# Patient Record
Sex: FEMALE | Race: WHITE | NOT HISPANIC OR LATINO
[De-identification: names, ages, dates, MRNs, and addresses within clinical notes are randomized per-mention and may not be internally consistent; named-entity substitution may affect disease eponyms.]

---
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
## 2021-01-04 ENCOUNTER — NON-APPOINTMENT (OUTPATIENT)
Age: 21
End: 2021-01-04

## 2021-01-08 ENCOUNTER — LABORATORY RESULT (OUTPATIENT)
Age: 21
End: 2021-01-08

## 2021-01-08 ENCOUNTER — APPOINTMENT (OUTPATIENT)
Dept: DISASTER EMERGENCY | Facility: CLINIC | Age: 21
End: 2021-01-08

## 2021-01-11 ENCOUNTER — INPATIENT (INPATIENT)
Facility: HOSPITAL | Age: 21
LOS: 1 days | Discharge: HOME | DRG: 93 | End: 2021-01-13
Attending: HOSPITALIST | Admitting: HOSPITALIST
Payer: MEDICAID

## 2021-01-11 VITALS
TEMPERATURE: 98 F | OXYGEN SATURATION: 100 % | DIASTOLIC BLOOD PRESSURE: 75 MMHG | SYSTOLIC BLOOD PRESSURE: 105 MMHG | RESPIRATION RATE: 18 BRPM | HEART RATE: 99 BPM

## 2021-01-11 DIAGNOSIS — N32.89 OTHER SPECIFIED DISORDERS OF BLADDER: ICD-10-CM

## 2021-01-11 DIAGNOSIS — N39.0 URINARY TRACT INFECTION, SITE NOT SPECIFIED: ICD-10-CM

## 2021-01-11 DIAGNOSIS — G40.909 EPILEPSY, UNSPECIFIED, NOT INTRACTABLE, WITHOUT STATUS EPILEPTICUS: ICD-10-CM

## 2021-01-11 DIAGNOSIS — Z29.9 ENCOUNTER FOR PROPHYLACTIC MEASURES, UNSPECIFIED: ICD-10-CM

## 2021-01-11 DIAGNOSIS — I34.1 NONRHEUMATIC MITRAL (VALVE) PROLAPSE: ICD-10-CM

## 2021-01-11 DIAGNOSIS — N20.0 CALCULUS OF KIDNEY: ICD-10-CM

## 2021-01-11 LAB
ALBUMIN SERPL ELPH-MCNC: 4.4 G/DL — SIGNIFICANT CHANGE UP (ref 3.3–5.2)
ALP SERPL-CCNC: 95 U/L — SIGNIFICANT CHANGE UP (ref 40–120)
ALT FLD-CCNC: 14 U/L — SIGNIFICANT CHANGE UP
ANION GAP SERPL CALC-SCNC: 10 MMOL/L — SIGNIFICANT CHANGE UP (ref 5–17)
AST SERPL-CCNC: 23 U/L — SIGNIFICANT CHANGE UP
BASOPHILS # BLD AUTO: 0.02 K/UL — SIGNIFICANT CHANGE UP (ref 0–0.2)
BASOPHILS NFR BLD AUTO: 0.4 % — SIGNIFICANT CHANGE UP (ref 0–2)
BILIRUB SERPL-MCNC: 0.4 MG/DL — SIGNIFICANT CHANGE UP (ref 0.4–2)
BUN SERPL-MCNC: 7 MG/DL — LOW (ref 8–20)
CALCIUM SERPL-MCNC: 9.4 MG/DL — SIGNIFICANT CHANGE UP (ref 8.6–10.2)
CHLORIDE SERPL-SCNC: 105 MMOL/L — SIGNIFICANT CHANGE UP (ref 98–107)
CO2 SERPL-SCNC: 23 MMOL/L — SIGNIFICANT CHANGE UP (ref 22–29)
CREAT SERPL-MCNC: 0.53 MG/DL — SIGNIFICANT CHANGE UP (ref 0.5–1.3)
EOSINOPHIL # BLD AUTO: 0.14 K/UL — SIGNIFICANT CHANGE UP (ref 0–0.5)
EOSINOPHIL NFR BLD AUTO: 2.8 % — SIGNIFICANT CHANGE UP (ref 0–6)
GLUCOSE SERPL-MCNC: 80 MG/DL — SIGNIFICANT CHANGE UP (ref 70–99)
HCT VFR BLD CALC: 38.4 % — SIGNIFICANT CHANGE UP (ref 34.5–45)
HGB BLD-MCNC: 12.7 G/DL — SIGNIFICANT CHANGE UP (ref 11.5–15.5)
IMM GRANULOCYTES NFR BLD AUTO: 0 % — SIGNIFICANT CHANGE UP (ref 0–1.5)
LYMPHOCYTES # BLD AUTO: 1.94 K/UL — SIGNIFICANT CHANGE UP (ref 1–3.3)
LYMPHOCYTES # BLD AUTO: 38.5 % — SIGNIFICANT CHANGE UP (ref 13–44)
MAGNESIUM SERPL-MCNC: 2.1 MG/DL — SIGNIFICANT CHANGE UP (ref 1.6–2.6)
MCHC RBC-ENTMCNC: 27.4 PG — SIGNIFICANT CHANGE UP (ref 27–34)
MCHC RBC-ENTMCNC: 33.1 GM/DL — SIGNIFICANT CHANGE UP (ref 32–36)
MCV RBC AUTO: 82.9 FL — SIGNIFICANT CHANGE UP (ref 80–100)
MONOCYTES # BLD AUTO: 0.34 K/UL — SIGNIFICANT CHANGE UP (ref 0–0.9)
MONOCYTES NFR BLD AUTO: 6.7 % — SIGNIFICANT CHANGE UP (ref 2–14)
NEUTROPHILS # BLD AUTO: 2.6 K/UL — SIGNIFICANT CHANGE UP (ref 1.8–7.4)
NEUTROPHILS NFR BLD AUTO: 51.6 % — SIGNIFICANT CHANGE UP (ref 43–77)
PLATELET # BLD AUTO: 335 K/UL — SIGNIFICANT CHANGE UP (ref 150–400)
POTASSIUM SERPL-MCNC: 3.9 MMOL/L — SIGNIFICANT CHANGE UP (ref 3.5–5.3)
POTASSIUM SERPL-SCNC: 3.9 MMOL/L — SIGNIFICANT CHANGE UP (ref 3.5–5.3)
PROT SERPL-MCNC: 7.3 G/DL — SIGNIFICANT CHANGE UP (ref 6.6–8.7)
RBC # BLD: 4.63 M/UL — SIGNIFICANT CHANGE UP (ref 3.8–5.2)
RBC # FLD: 12.5 % — SIGNIFICANT CHANGE UP (ref 10.3–14.5)
SODIUM SERPL-SCNC: 138 MMOL/L — SIGNIFICANT CHANGE UP (ref 135–145)
WBC # BLD: 5.04 K/UL — SIGNIFICANT CHANGE UP (ref 3.8–10.5)
WBC # FLD AUTO: 5.04 K/UL — SIGNIFICANT CHANGE UP (ref 3.8–10.5)

## 2021-01-11 PROCEDURE — 99222 1ST HOSP IP/OBS MODERATE 55: CPT

## 2021-01-11 PROCEDURE — 95720 EEG PHY/QHP EA INCR W/VEEG: CPT

## 2021-01-11 RX ORDER — DESIPRAMINE HYDROCHLORIDE 100 MG/1
10 TABLET ORAL AT BEDTIME
Refills: 0 | Status: DISCONTINUED | OUTPATIENT
Start: 2021-01-11 | End: 2021-01-13

## 2021-01-11 RX ORDER — DESIPRAMINE HYDROCHLORIDE 100 MG/1
1 TABLET ORAL
Qty: 0 | Refills: 0 | DISCHARGE

## 2021-01-11 RX ORDER — ENOXAPARIN SODIUM 100 MG/ML
40 INJECTION SUBCUTANEOUS EVERY 24 HOURS
Refills: 0 | Status: DISCONTINUED | OUTPATIENT
Start: 2021-01-11 | End: 2021-01-13

## 2021-01-11 RX ADMIN — DESIPRAMINE HYDROCHLORIDE 10 MILLIGRAM(S): 100 TABLET ORAL at 21:37

## 2021-01-11 NOTE — H&P ADULT - ASSESSMENT
20 year-old female with a history of recurrent UTI since age 8, B/L nephrolithiasis and indeterminate 1.5cm posterior bladder lesion on CT who presents for continuous vEEg to differentiate epileptic from nonepileptic events. In October 2020, patient had intermittent episodes of B/L eye blinking while maintaining awareness and still able to speak, though struggles. In November, behavior changed to staring and unresponsiveness, but still able to hear and somewhat aware of her surroundings. Then, developed unresponsiveness, facial grimacing, intermittent twitching/jerking of different parts of the body, back arching, hands stiffened and positioned in contracted states. These episodes may last up to 2 hours, with gradual return of movements and then speech. Has been admitted to [facility] for these episodes. Discharged without AED. Of note, patient has had episodes of chest pain intermittently, and is by cardiology outpatient.

## 2021-01-11 NOTE — H&P ADULT - NSHPPHYSICALEXAM_GEN_ALL_CORE
PHYSICAL EXAM:  GENERAL: NAD, well-groomed, well-developed  HEAD:  Atraumatic, Normocephalic  NECK: Supple, No JVD, Normal thyroid  NERVOUS SYSTEM:  Alert & Oriented X3, Good concentration; Motor Strength 5/5 B/L upper and lower extremities  CHEST/LUNG: Clear to auscultation bilaterally; No rales, rhonchi, wheezing, or rubs  HEART: Regular rate and rhythm; No murmurs, rubs, or gallops  ABDOMEN: Soft, Nontender, Nondistended; Bowel sounds present  EXTREMITIES:  2+ Peripheral Pulses, No clubbing, cyanosis, or edema

## 2021-01-11 NOTE — H&P ADULT - NSHPREVIEWOFSYSTEMS_GEN_ALL_CORE
REVIEW OF SYSTEMS:  CONSTITUTIONAL: No fever, weight loss, or fatigue  EYES: No eye pain or visual disturbances  RESPIRATORY: No cough, wheezing, or chills; No shortness of breath  CARDIOVASCULAR: No chest pain, palpitations, dizziness, or leg swelling  GASTROINTESTINAL: No abdominal or epigastric pain. No nausea or vomiting; No diarrhea or constipation.   GENITOURINARY: No dysuria, frequency, hematuria, or incontinence  NEUROLOGICAL: No headaches, memory loss, loss of strength, numbness, or tremors  SKIN: No itching, burning, rashes, or lesions   MUSCULOSKELETAL: No joint pain or swelling; No muscle, back, or extremity pain  PSYCHIATRIC: No depression, anxiety, mood swings, or difficulty sleeping

## 2021-01-11 NOTE — CONSULT NOTE ADULT - SUBJECTIVE AND OBJECTIVE BOX
[facility]                                                                     Lissett Brown MD                                              Epilepsy Consult #: [phone] ([phone])                                               Office: [address] [place] [address], [address]                                                 Phone: [phone]; Fax: [phone]                            ==============================================    EPILEPSY INITIAL CONSULT NOTE      HPI  This is a 20 year-old RH female with a history of recurrent UTI since age 8, BL nephrolithiasis and indeterminate 1.5cm posterior bladder lesion on CT who presents to EMU to differentiate epileptic from nonepileptic events    October of this year, began to develop intermittent episodes of BL eye blinking while maintaining awareness and still able to speak, though struggles. In November, behavior changed to staring and unresponsiveness, but still able to hear and somewhat aware of her surroundings. Then last few days, developed unresponsiveness, facial grimacing, intermittent twitching/jerking of different parts of the body, back arching, hands stiffened and positioned in contracted states. These episodes may last up to 2 hours, with gradual return of movements and then speech. Mother recorded all 3 types of behaviors on video. Reviewed all 3 in office today. Has been admitted to [facility] for these episodes. Discharged without AED.     When asked about stress in life, admits to emotional stress and exhaust of seemingly endless workup, treatment and doctor visits for recurrent UTI, pain from BL nephrolithiasis, and recent discovery of the bladder lesion. Pending cystoscopy next week. Parents  when she was 6yo, and she sometimes feel she needs to be strong for her mother and be the emotional support for her mother.     SEIZURE RISK FACTORS:  Patient was a product of normal pregnancy and delivery. No history of febrile seizure, TBI, CNS infection, or FH of seizures.    CURRENT AED:  none    PREVIOUS AED:  none    IMAGING:   CTH: prominent ventricles     NEUROPHYSIOLOGY:  Per pt, rEEG at [facility] was unremarkable.     NEUROPSYCHOLOGY:   none      PAST MEDICAL HISTORY:  recurrent UTI since age 8, BL nephrolithiasis and indeterminate 1.5cm posterior bladder lesion    PAST SURGICAL HISTORY:   none    HOME MEDICATIONS:     ALLERGIES:   NKDA    FAMILY HISTORY:  Non-contributory    SOCIAL HISTORY:   Denied EtOH, tobacco, illicit drugs.    ROS:  Negative for constitutional, skin, eyes, ENT, respiratory, cardiovascular, gastrointestinal, genitourinary, musculoskeletal, neurologic, psychiatric, hematology/lymphatics, endocrine, allergic/immunologic.      VITALS:  pending    GENERAL PHYSICAL EXAM:  GEN: no distress  HEENT: NCAT, OP clear  EYES: sclera white, conjunctiva clear, no nystagmus  NECK: supple  CV: RRR, no murmur     		  PULM: CTAB, no wheezing  ABD: soft ABD, +BS, NT  EXT: peripheral pulse intact, no cyanosis  MSK: muscle tone and strength normal  SKIN: warm, dry, no rash or lesion on exposed skin    NEUROLOGICAL EXAM:  Mental Status  Orientation: alert and oriented to person, place, time, and situation   Language: clear and fluent, intact comprehension and repetition, intact naming and reading    Cranial Nerves  II: full visual fields intact   III, IV, VI: PERRL, EOMI  V, VII: facial sensation and movement intact and symmetric   VIII: hearing intact   IX, X: uvula midline, soft palate elevates normally   XI: BL shoulder shrug intact   XII: tongue midline    Motor  5/5 BUE and BLE                 Tone and bulk are normal in upper and lower limbs  No pronator drift    Sensation  Intact to light touch and pinprick in all 4 EXTs    Reflex  2+ in BL biceps and patella                                   Plantar responses downward bilaterally    Coordination  Normal FTN bilaterally    Gait  Deferred      LABS:   pending                 Alert-The patient is alert, awake and responds to voice. The patient is oriented to time, place, and person. The triage nurse is able to obtain subjective information.

## 2021-01-11 NOTE — CONSULT NOTE ADULT - ASSESSMENT
20 year-old RH female with a history of recurrent UTI since age 8, BL nephrolithiasis and indeterminate 1.5cm posterior bladder lesion on CT who presents to EMU to differentiate epileptic from nonepileptic events.      Recommendation:  - cvEEG to characterize seizure-like spells     - not on antiepileptic medication   - notify epilepsy to review EEG before giving any med for seizure-like activity    - seizure precaution      Thank you for allowing Epilepsy to participate in the care of this patient.   ______________________  Lissett Brown MD   [facility] [place] [facility]  Cell: [phone]  Epilepsy Consult #: [phone] ([phone])

## 2021-01-11 NOTE — H&P ADULT - HISTORY OF PRESENT ILLNESS
20 year-old female with a history of recurrent UTI since age 8, B/L nephrolithiasis and indeterminate 1.5cm posterior bladder lesion on CT who presents for continuous vEEg to differentiate epileptic from nonepileptic events. In October 2020, patient had intermittent episodes of B/L eye blinking while maintaining awareness and still able to speak, though struggles. In November, behavior changed to staring and unresponsiveness, but still able to hear and somewhat aware of her surroundings. Then, developed unresponsiveness, facial grimacing, intermittent twitching/jerking of different parts of the body, back arching, hands stiffened and positioned in contracted states. These episodes may last up to 2 hours, with gradual return of movements and then speech. Has been admitted to [facility] for these episodes. Discharged without AED. Of note, patient has had episodes of chest pain intermittently, and is by cardiology outpatient.

## 2021-01-11 NOTE — H&P ADULT - ATTENDING COMMENTS
Patient seen and examined ,   AAOX4 , comfortable , no complaints at present .   Above noted and reviewed with NP ( Sara )   Vitals - pending   Dr Brown/ Epileptologist noted and appreciated   continue vEEG   fall / seizure precautions   follow up labs   Lovenox for dvt prophylaxis

## 2021-01-12 DIAGNOSIS — I95.1 ORTHOSTATIC HYPOTENSION: ICD-10-CM

## 2021-01-12 LAB
SARS-COV-2 IGG SERPL QL IA: NEGATIVE — SIGNIFICANT CHANGE UP
SARS-COV-2 IGM SERPL IA-ACNC: <0.1 INDEX — SIGNIFICANT CHANGE UP

## 2021-01-12 PROCEDURE — 99233 SBSQ HOSP IP/OBS HIGH 50: CPT

## 2021-01-12 PROCEDURE — 95720 EEG PHY/QHP EA INCR W/VEEG: CPT

## 2021-01-12 RX ADMIN — DESIPRAMINE HYDROCHLORIDE 10 MILLIGRAM(S): 100 TABLET ORAL at 21:08

## 2021-01-12 NOTE — PROGRESS NOTE ADULT - ASSESSMENT
20 year-old female with a history of recurrent UTI since age 8, B/L nephrolithiasis and indeterminate 1.5cm posterior bladder lesion on CT who presents for continuous vEEg to differentiate epileptic from nonepileptic events. In October 2020, patient had intermittent episodes of B/L eye blinking while maintaining awareness and still able to speak, though struggles. In November, behavior changed to staring and unresponsiveness, but still able to hear and somewhat aware of her surroundings. Then, developed unresponsiveness, facial grimacing, intermittent twitching/jerking of different parts of the body, back arching, hands stiffened and positioned in contracted states. These episodes may last up to 2 hours, with gradual return of movements and then speech. Has been admitted to [facility] [place] for these episodes. Discharged without AED. Of note, patient has had episodes of chest pain intermittently, and is by cardiology outpatient.

## 2021-01-12 NOTE — PROGRESS NOTE ADULT - ATTENDING COMMENTS
Patient seen and examined , noted with Postural tachycardia , HR increased > 30 bpm with standing position .   Patient states she had head injury about 1 yr ago , for several months with sob / chest pain ( feeling heart pounding )   with ambulation , feels fatigue , seen by cardio , negative stress test , ECHO with MVP ,   had blood w/u witH PMD - nl ( including CBC, CMP , TFT , PARVIZ , RF ) , Tiffanie Granados IGG + .   Will recommend on EP eval to r/o POTS ( POSTURAL ORTHOSTATIC TACHYCARDIA ) - may be done as on outpatient ,   will recommend Dr Caldwell , spoke to mother ( Mr Sharifa Carrizales ) .   Above noted and reviewed with NP ( Sara ) ,   agree with above , fall/ seizure precautions / dvt prophylaxis

## 2021-01-12 NOTE — PROGRESS NOTE ADULT - SUBJECTIVE AND OBJECTIVE BOX
[facility]                                                                     Lissett Brown MD                                              Epilepsy Consult #: [phone] ([phone])                                               Office: [address] [place] [address], [address]                                                 Phone: [phone]; Fax: [phone]                            ==============================================    EPILEPSY FOLLOW-UP NOTE      INTERVAL HISTORY:  One habitual event captured yesterday, consisted of about an hour of intermittent motor sequence involving abrupt, brief, intermittent right shoulder twitching/shrugging, head jerking, rapid bilateral eye blinking and staring, at times with choreiform, dystonic and hemiballistic components in the right upper extremity. No ictal correlate on EEG. Semiology more suggestive of motor tic.    MEDICATIONS:   desipramine 10 milliGRAM(s) Oral at bedtime  enoxaparin Injectable 40 milliGRAM(s) SubCutaneous every 24 hours    LAST 24-HR VITALS:  T(C): 36.7 (01-12-21 @ 08:49), Max: 36.9 (01-11-21 @ 17:04)  HR: 99 (01-12-21 @ 08:49) (99 - 103)  BP: 101/70 (01-12-21 @ 08:49) (101/70 - 117/82)  ABP: --  RR: 19 (01-12-21 @ 08:49) (18 - 19)  SpO2: 100% (01-11-21 @ 17:04) (100% - 100%)  CVP(cm H2O): --    GENERAL PHYSICAL EXAM:  GEN: no distress   HEENT: NCAT, OP clear  EYES: sclera white, conjunctiva clear, no nystagmus  NECK: supple  CV: RRR, no murmur     		  PULM: CTAB, no wheezing  ABD: soft, +BS, NT  EXT: peripheral pulse intact, no cyanosis  MSK: muscle tone and strength normal  SKIN: warm, dry, no rash or lesion on exposed skin     NEUROLOGICAL EXAM:  Mental Status  Orientation: alert and oriented to person, place, time, and situation   Language: clear and fluent, intact comprehension and repetition, intact naming and reading    Cranial Nerves  II: full visual fields intact   III, IV, VI: PERRL, EOMI  V, VII: facial sensation and movement intact and symmetric   VIII: hearing intact   IX, X: uvula midline, soft palate elevates normally   XI: BL shoulder shrug intact   XII: tongue midline    Motor  5/5 BUE and BLE                 Tone and bulk are normal in upper and lower limbs  No pronator drift    Sensation  Intact to light touch and pinprick in all 4 EXTs    Reflex  2+ in BL biceps and patella                                    Plantar responses downward bilaterally    Coordination  Normal FTN bilaterally    Gait  Deferred       LABS:                          12.7   5.04  )-----------( 335      ( 11 Jan 2021 16:02 )             38.4     01-11    138  |  105  |  7.0<L>  ----------------------------<  80  3.9   |  23.0  |  0.53    Ca    9.4      11 Jan 2021 16:02  Mg     2.1     01-11    TPro  7.3  /  Alb  4.4  /  TBili  0.4  /  DBili  x   /  AST  23  /  ALT  14  /  AlkPhos  95  01-11    CAPILLARY BLOOD GLUCOSE        LIVER FUNCTIONS - ( 11 Jan 2021 16:02 )  Alb: 4.4 g/dL / Pro: 7.3 g/dL / ALK PHOS: 95 U/L / ALT: 14 U/L / AST: 23 U/L / GGT: x               OTHER IMAGING AND STUDIES:    EMU 1/11-1/12/21:  EEG Summary:  Normal EEG in the awake, drowsy and asleep states.  - One habitual event consisted of sequence of abrupt, brief, intermittent right shoulder twitching/shrugging, head jerking, rapid bilateral eye blinking and staring, at times with choreiform, dystonic and hemiballistic components in the right upper extremity.     Impression/Clinical Correlate:  1/11 – 1/12: One habitual event recorded without abnormal EEG correlate.    During this EMU admission, one habitual event recorded suggestive of motor tic. Normal video EEG in awake, drowsy and asleep states.                                                                      [facility]                                                                     Lissett Brown MD                                              Epilepsy Consult #: [phone] ([phone])                                               Office: [address] [place] [address], [address]                                                 Phone: [phone]; Fax: [phone]                            ==============================================    EPILEPSY FOLLOW-UP NOTE      INTERVAL HISTORY:  One habitual event captured yesterday, consisted of about an hour of intermittent motor sequence involving abrupt, brief, intermittent right shoulder twitching/shrugging, head jerking, rapid bilateral eye blinking and staring, at times with choreiform, dystonic and hemiballistic components in the right upper extremity. No ictal correlate on EEG. Semiology suggestive of motor tic, but pt was unresponsive during and amnestic after the event, which are atypical features of motor tic.    MEDICATIONS:   desipramine 10 milliGRAM(s) Oral at bedtime  enoxaparin Injectable 40 milliGRAM(s) SubCutaneous every 24 hours    LAST 24-HR VITALS:  T(C): 36.7 (01-12-21 @ 08:49), Max: 36.9 (01-11-21 @ 17:04)  HR: 99 (01-12-21 @ 08:49) (99 - 103)  BP: 101/70 (01-12-21 @ 08:49) (101/70 - 117/82)  ABP: --  RR: 19 (01-12-21 @ 08:49) (18 - 19)  SpO2: 100% (01-11-21 @ 17:04) (100% - 100%)  CVP(cm H2O): --    GENERAL PHYSICAL EXAM:  GEN: no distress   HEENT: NCAT, OP clear  EYES: sclera white, conjunctiva clear, no nystagmus  NECK: supple  CV: RRR, no murmur     		  PULM: CTAB, no wheezing  ABD: soft, +BS, NT  EXT: peripheral pulse intact, no cyanosis  MSK: muscle tone and strength normal  SKIN: warm, dry, no rash or lesion on exposed skin     NEUROLOGICAL EXAM:  Mental Status  Orientation: alert and oriented to person, place, time, and situation   Language: clear and fluent, intact comprehension and repetition, intact naming and reading    Cranial Nerves  II: full visual fields intact   III, IV, VI: PERRL, EOMI  V, VII: facial sensation and movement intact and symmetric   VIII: hearing intact   IX, X: uvula midline, soft palate elevates normally   XI: BL shoulder shrug intact   XII: tongue midline    Motor  5/5 BUE and BLE                 Tone and bulk are normal in upper and lower limbs  No pronator drift    Sensation  Intact to light touch and pinprick in all 4 EXTs    Reflex  2+ in BL biceps and patella                                    Plantar responses downward bilaterally    Coordination  Normal FTN bilaterally    Gait  Deferred       LABS:                          12.7   5.04  )-----------( 335      ( 11 Jan 2021 16:02 )             38.4     01-11    138  |  105  |  7.0<L>  ----------------------------<  80  3.9   |  23.0  |  0.53    Ca    9.4      11 Jan 2021 16:02  Mg     2.1     01-11    TPro  7.3  /  Alb  4.4  /  TBili  0.4  /  DBili  x   /  AST  23  /  ALT  14  /  AlkPhos  95  01-11    CAPILLARY BLOOD GLUCOSE        LIVER FUNCTIONS - ( 11 Jan 2021 16:02 )  Alb: 4.4 g/dL / Pro: 7.3 g/dL / ALK PHOS: 95 U/L / ALT: 14 U/L / AST: 23 U/L / GGT: x               OTHER IMAGING AND STUDIES:    EMU 1/11-1/12/21:  EEG Summary:  Normal EEG in the awake, drowsy and asleep states.  - One habitual event consisted of sequence of abrupt, brief, intermittent right shoulder twitching/shrugging, head jerking, rapid bilateral eye blinking and staring, at times with choreiform, dystonic and hemiballistic components in the right upper extremity. Patient awake but unresponsive during the event. Amnestic to the entire event.     Impression/Clinical Correlate:  1/11 – 1/12: One habitual event recorded without abnormal EEG correlate.    Normal video EEG in awake, drowsy and asleep states.

## 2021-01-12 NOTE — EEG REPORT - NS EEG TEXT BOX
[facility]  Epilepsy Monitoring Unit Report    [facility]: [address], [place] [address], Phone [phone]  [facility]: [address] [place] [address], Phone [phone]  [place] Office: [address], [address], [place] [address] Phone [phone]    [facility]: [address] [place] [address], Phone [phone]  [place] Office: [address] [place] [address], Phone [phone]    Patient Name: Gloria Trent    Age: 20 year, : 2000  Patient ID: -, MRN #: -, Russell: -    Physician Ordering Inpatient EEG: Swati Lucas  Referral Source to EMU: elective admission – Dr. Brown    EMU Study Started: 14:09 on 21    EMU Study Ended: pending discharge    Study Information:    EEG Recording Technique:  The patient underwent continuous Video-EEG monitoring, using Telemetry System hardware on the XLTek Digital System. EEG and video data were stored on a computer hard drive with important events saved in digital archive files. The material was reviewed by a physician (electroencephalographer / epileptologist) on a daily basis. Ron and seizure detection algorithms were utilized and reviewed. An EEG Technician attended to the patient, and was available throughout daytime work hours.  The epilepsy center neurologist was available in person or on call 24-hours per day.    EEG Placement and Labeling of Electrodes:  The EEG was performed utilizing 20 channel referential EEG connections (coronal over temporal over parasagittal montage) using all standard 10-20 electrode placements with EKG, with additional electrodes placed in the inferior temporal region using the modified 10-10 montage electrode placements for elective admissions, or if deemed necessary. Recording was at a sampling rate of 256 samples per second per channel. Time synchronized digital video recording was done simultaneously with EEG recording. A low light infrared camera was used for low light recording.     History:  This is a 20 year-old RH female with a history of recurrent UTI since age 8, BL nephrolithiasis and indeterminate 1.5cm posterior bladder lesion on CT who presents to EMU to differentiate epileptic from nonepileptic events    October of this year, began to develop intermittent episodes of BL eye blinking while maintaining awareness and still able to speak, though struggles. In November, behavior changed to staring and unresponsiveness, but still able to hear and somewhat aware of her surroundings. Then last few days, developed unresponsiveness, facial grimacing, intermittent twitching/jerking of different parts of the body, back arching, hands stiffened and positioned in contracted states. These episodes may last up to 2 hours, with gradual return of movements and then speech. Mother recorded all 3 types of behaviors on video. Reviewed all 3 in office today. Has been admitted to [facility] for these episodes. Discharged without AED.     When asked about stress in life, admits to emotional stress and exhaust of seemingly endless workup, treatment and doctor visits for recurrent UTI, pain from BL nephrolithiasis, and recent discovery of the bladder lesion. Pending cystoscopy next week. Parents  when she was 6yo, and she sometimes feel she needs to be strong for her mother and be the emotional support for her mother.     SEIZURE RISK FACTORS:  Patient was a product of normal pregnancy and delivery. No history of febrile seizure, TBI, CNS infection, or FH of seizures.    CURRENT AED:  none    PREVIOUS AED:  none    IMAGING:   CTH: prominent ventricles     NEUROPHYSIOLOGY:  Per pt, rEEG at [facility] was unremarkable.     NEUROPSYCHOLOGY:   None    Home Antiepileptic Medication and Device  none    Interpretation:    Start Date: 2021 – Day 1                                Start Time – 14:09      Duration – 17hr 49m    Daily EEG Visual Analysis  Findings: The background was continuous, spontaneously variable and reactive. During wakefulness, the posterior dominant rhythm consisted of symmetric, well-modulated 10-11 Hz activity, with amplitude to 80 uV, that attenuated to eye opening.     Background Slowing:  No generalized background slowing was present.    Focal Slowing:   None were present.    Sleep Background:  Drowsiness was characterized by fragmentation, attenuation, and slowing of the background activity.      Other Non-Epileptiform Findings:  None were present.    Interictal Epileptiform Activity:   None were present.    Events:  === Event #1 ===  Time: intermittently from about 16:44 to 17:40		  Seizure type: non-epileptic  Electrographic onset location: no ictal rhythm  Electrographic evolution: no ictal rhythm  State at onset: awake    Clinical/EEG Findings  No abnormal EEG correlate before, during and after the event. Baseline awake background.    d1 16:44:27		looking down at phone  d1 16:44:33		myoclonic twitching of right shoulder  d1 16:44:42		R shoulder twitch  d1 16:44:49		intermittent twitching of R shoulder  d1 16:44:54		awake  d1 16:44:58		staring  d1 16:45:08	motor sequence of abrupt, intermittent right should twitching/shrugging, head jerking, rapid eye blinking  d1 16:45:43		RN enters room and pushes button  d1 16:46:51		repetitive mvms: eye blinking, R shoulder twitching/shrugging, head jerking  d1 16:47:24		temporarily stopped  d1 16:47:39		similar mvm's  d1 16:49:45		staring  d1 16:50:18		similar mvm's  d1 16:50:43		blanket uncovered  d1 16:55:29		slowing down  d1 16:57:05		similar mvm's  d1 17:18:43		staring  d1 17:26:20		RUE mvm: twitching, choreiform  d1 17:27:28		RUE hemiballistic movements  d1 17:30:22		RUE hemiballistic movements    EKG Findings: baseline HR  Habitual event?: yes    Artifacts:  Intermittent myogenic and movement artifacts were noted.    ECG:  The heart rate on single channel ECG was predominantly between 80-90 BPM.    AED:  None     EEG Summary:  Normal EEG in the awake, drowsy and asleep states.  - One habitual event consisted of sequence of abrupt, brief, intermittent right shoulder twitching/shrugging, head jerking, rapid bilateral eye blinking and staring, at times with choreiform, dystonic and hemiballistic components in the right upper extremity.     Impression/Clinical Correlate:   – : One habitual event recorded without abnormal EEG correlate.    During this EMU admission, one habitual event recorded suggestive of motor tic. Normal video EEG in awake, drowsy and asleep states.    ________________________________________    Lissett Brown MD  Attending Physician, [facility] [place]   [facility]  Epilepsy Monitoring Unit Report    [facility]: [address], [place] [address], Phone [phone]  [facility]: [address] [place] [address], Phone [phone]  [place] Office: [address], [address], [place] [address] Phone [phone]    [facility]: [address] [place] [address], Phone [phone]  [place] Office: [address] [place] [address], Phone [phone]    Patient Name: Gloria Trent    Age: 20 year, : 2000  Patient ID: -, MRN #: -, Russell: -    Physician Ordering Inpatient EEG: Swati Lucas  Referral Source to EMU: elective admission – Dr. Brown    EMU Study Started: 14:09 on 21    EMU Study Ended: pending discharge    Study Information:    EEG Recording Technique:  The patient underwent continuous Video-EEG monitoring, using Telemetry System hardware on the XLTek Digital System. EEG and video data were stored on a computer hard drive with important events saved in digital archive files. The material was reviewed by a physician (electroencephalographer / epileptologist) on a daily basis. Ron and seizure detection algorithms were utilized and reviewed. An EEG Technician attended to the patient, and was available throughout daytime work hours.  The epilepsy center neurologist was available in person or on call 24-hours per day.    EEG Placement and Labeling of Electrodes:  The EEG was performed utilizing 20 channel referential EEG connections (coronal over temporal over parasagittal montage) using all standard 10-20 electrode placements with EKG, with additional electrodes placed in the inferior temporal region using the modified 10-10 montage electrode placements for elective admissions, or if deemed necessary. Recording was at a sampling rate of 256 samples per second per channel. Time synchronized digital video recording was done simultaneously with EEG recording. A low light infrared camera was used for low light recording.     History:  This is a 20 year-old RH female with a history of recurrent UTI since age 8, BL nephrolithiasis and indeterminate 1.5cm posterior bladder lesion on CT who presents to EMU to differentiate epileptic from nonepileptic events    October of this year, began to develop intermittent episodes of BL eye blinking while maintaining awareness and still able to speak, though struggles. In November, behavior changed to staring and unresponsiveness, but still able to hear and somewhat aware of her surroundings. Then last few days, developed unresponsiveness, facial grimacing, intermittent twitching/jerking of different parts of the body, back arching, hands stiffened and positioned in contracted states. These episodes may last up to 2 hours, with gradual return of movements and then speech. Mother recorded all 3 types of behaviors on video. Reviewed all 3 in office today. Has been admitted to [facility] for these episodes. Discharged without AED.     When asked about stress in life, admits to emotional stress and exhaust of seemingly endless workup, treatment and doctor visits for recurrent UTI, pain from BL nephrolithiasis, and recent discovery of the bladder lesion. Pending cystoscopy next week. Parents  when she was 4yo, and she sometimes feel she needs to be strong for her mother and be the emotional support for her mother.     SEIZURE RISK FACTORS:  Patient was a product of normal pregnancy and delivery. No history of febrile seizure, TBI, CNS infection, or FH of seizures.    CURRENT AED:  none    PREVIOUS AED:  none    IMAGING:   CTH: prominent ventricles     NEUROPHYSIOLOGY:  Per pt, rEEG at [facility] was unremarkable.     NEUROPSYCHOLOGY:   None    Home Antiepileptic Medication and Device  none    Interpretation:    Start Date: 2021 – Day 1                                Start Time – 14:09      Duration – 17hr 49m    Daily EEG Visual Analysis  Findings: The background was continuous, spontaneously variable and reactive. During wakefulness, the posterior dominant rhythm consisted of symmetric, well-modulated 10-11 Hz activity, with amplitude to 80 uV, that attenuated to eye opening.     Background Slowing:  No generalized background slowing was present.    Focal Slowing:   None were present.    Sleep Background:  Drowsiness was characterized by fragmentation, attenuation, and slowing of the background activity.      Other Non-Epileptiform Findings:  None were present.    Interictal Epileptiform Activity:   None were present.    Events:  === Event #1 ===  Time: intermittently from about 16:44 to 17:50		  Seizure type: probably non-epileptic  Electrographic onset location: no ictal rhythm  Electrographic evolution: no ictal rhythm  State at onset: awake    Clinical/EEG Findings  No abnormal EEG correlate before, during and after the event. Baseline awake background with PDR.    d1 16:25:19		awake   d1 16:44:25		EVENT #1  d1 16:44:27		looking down at phone  d1 16:44:29		(pt next day stated she doesn't recall this event; not aware she had the twitching)  d1 16:44:33		myoclonic twitching of right shoulder  d1 16:44:42		R shoulder twitch  d1 16:44:49		intermittent twitching of R shoulder  d1 16:44:54		awake  d1 16:44:58		staring  d1 16:45:08		motor sequence of abrupt, intermittent right should twitching/shrugging, head   jerking, rapid eye blinking  d1 16:45:43		RN enters room and pushes button  d1 16:45:45		Patient Event  d1 16:45:53		RN asking date; pt doesn't respond  d1 16:46:30		RN calling pt's name; pt doesn't respond  d1 16:46:42		RN reads  going to 118  d1 16:46:51		repetitive mvms: eye blinking, R shoulder twitching/shrugging, head jerking  d1 16:47:07		not responding to RN's name calling  d1 16:47:18		behavioral arrest, staring  d1 16:47:24		temporarily stopped  d1 16:47:39		similar mvm's  d1 16:48:07		staring, beh arrest, not responding  d1 16:49:45		staring  d1 16:50:18		similar mvm's  d1 16:50:43		blanket uncovered  d1 16:52:17		intact pdr  d1 16:55:29		slowing down  d1 16:57:05		similar mvm's  d1 17:18:43		staring  d1 17:26:20		RUE mvm: twitching, choreiform  d1 17:27:28		RUE hemiballistic movements  d1 17:29:34		Patient Event  d1 17:30:22		RUE hemiballistic movements    EKG Findings: baseline awake HR in 120s  Habitual event?: yes    Artifacts:  Intermittent myogenic and movement artifacts were noted.    ECG:  The heart rate on single channel ECG was predominantly between 80-90 BPM.    AED:  None     EEG Summary:  Normal EEG in the awake, drowsy and asleep states.  - One habitual event consisted of sequence of abrupt, brief, intermittent right shoulder twitching/shrugging, head jerking, rapid bilateral eye blinking and staring, at times with choreiform, dystonic and hemiballistic components in the right upper extremity. Patient awake but unresponsive during the event. Amnestic to the entire event.     Impression/Clinical Correlate:   – : One habitual event recorded without abnormal EEG correlate.    Normal video EEG in awake, drowsy and asleep states.    ________________________________________    Lissett Brown MD  Attending Physician, [facility] [place]   [facility]  Epilepsy Monitoring Unit Report    [facility]: [address], [place] [address], Phone [phone]  [facility]: [address] [place] [address], Phone [phone]  [place] Office: [address], [address], [place] [address] Phone [phone]    [facility]: [address] [place] [address], Phone [phone]  [place] Office: [address] [place] [address], Phone [phone]    Patient Name: Gloria Trent    Age: 20 year, : 2000  Patient ID: -, MRN #: -, Russell: -    Physician Ordering Inpatient EEG: Swati Lucas  Referral Source to EMU: elective admission – Dr. Brown    EMU Study Started: 14:09 on 21    EMU Study Ended: pending discharge    Study Information:    EEG Recording Technique:  The patient underwent continuous Video-EEG monitoring, using Telemetry System hardware on the XLTek Digital System. EEG and video data were stored on a computer hard drive with important events saved in digital archive files. The material was reviewed by a physician (electroencephalographer / epileptologist) on a daily basis. Ron and seizure detection algorithms were utilized and reviewed. An EEG Technician attended to the patient, and was available throughout daytime work hours.  The epilepsy center neurologist was available in person or on call 24-hours per day.    EEG Placement and Labeling of Electrodes:  The EEG was performed utilizing 20 channel referential EEG connections (coronal over temporal over parasagittal montage) using all standard 10-20 electrode placements with EKG, with additional electrodes placed in the inferior temporal region using the modified 10-10 montage electrode placements for elective admissions, or if deemed necessary. Recording was at a sampling rate of 256 samples per second per channel. Time synchronized digital video recording was done simultaneously with EEG recording. A low light infrared camera was used for low light recording.     History:  This is a 20 year-old RH female with a history of recurrent UTI since age 8, BL nephrolithiasis and indeterminate 1.5cm posterior bladder lesion on CT who presents to EMU to differentiate epileptic from nonepileptic events    October of this year, began to develop intermittent episodes of BL eye blinking while maintaining awareness and still able to speak, though struggles. In November, behavior changed to staring and unresponsiveness, but still able to hear and somewhat aware of her surroundings. Then last few days, developed unresponsiveness, facial grimacing, intermittent twitching/jerking of different parts of the body, back arching, hands stiffened and positioned in contracted states. These episodes may last up to 2 hours, with gradual return of movements and then speech. Mother recorded all 3 types of behaviors on video. Reviewed all 3 in office today. Has been admitted to [facility] for these episodes. Discharged without AED.     When asked about stress in life, admits to emotional stress and exhaust of seemingly endless workup, treatment and doctor visits for recurrent UTI, pain from BL nephrolithiasis, and recent discovery of the bladder lesion. Pending cystoscopy next week. Parents  when she was 6yo, and she sometimes feel she needs to be strong for her mother and be the emotional support for her mother.     SEIZURE RISK FACTORS:  Patient was a product of normal pregnancy and delivery. No history of febrile seizure, TBI, CNS infection, or FH of seizures.    CURRENT AED:  none    PREVIOUS AED:  none    IMAGING:   CTH: prominent ventricles     NEUROPHYSIOLOGY:  Per pt, rEEG at [facility] was unremarkable.     NEUROPSYCHOLOGY:   None    Home Antiepileptic Medication and Device  none    Interpretation:    Start Date: 2021 – Day 1                                Start Time – 14:09      Duration – 17hr 49m    Daily EEG Visual Analysis  Findings: The background was continuous, spontaneously variable and reactive. During wakefulness, the posterior dominant rhythm consisted of symmetric, well-modulated 10-11 Hz activity, with amplitude to 80 uV, that attenuated to eye opening.     Background Slowing:  No generalized background slowing was present.    Focal Slowing:   None were present.    Sleep Background:  Drowsiness was characterized by fragmentation, attenuation, and slowing of the background activity.      Other Non-Epileptiform Findings:  None were present.    Interictal Epileptiform Activity:   None were present.    Events:  === Event #1 ===  Time: intermittently from about 16:44 to 17:50		  Seizure type: probably non-epileptic  Electrographic onset location: no ictal rhythm  Electrographic evolution: no ictal rhythm  State at onset: awake    Clinical/EEG Findings  No abnormal EEG correlate before, during and after the event. Baseline awake background with PDR.    d1 16:25:19		awake   d1 16:44:25		EVENT #1  d1 16:44:27		looking down at phone  d1 16:44:29		(pt next day stated she doesn't recall this event; not aware she had the twitching)  d1 16:44:33		myoclonic twitching of right shoulder  d1 16:44:42		R shoulder twitch  d1 16:44:49		intermittent twitching of R shoulder  d1 16:44:54		awake  d1 16:44:58		staring  d1 16:45:08		motor sequence of abrupt, intermittent right should twitching/shrugging, head   jerking, rapid eye blinking  d1 16:45:43		RN enters room and pushes button  d1 16:45:45		Patient Event  d1 16:45:53		RN asking date; pt doesn't respond  d1 16:46:30		RN calling pt's name; pt doesn't respond  d1 16:46:42		RN reads  going to 118  d1 16:46:51		repetitive mvms: eye blinking, R shoulder twitching/shrugging, head jerking  d1 16:47:07		not responding to RN's name calling  d1 16:47:18		behavioral arrest, staring  d1 16:47:24		temporarily stopped  d1 16:47:39		similar mvm's  d1 16:48:07		staring, beh arrest, not responding  d1 16:49:45		staring  d1 16:50:18		similar mvm's  d1 16:50:43		blanket uncovered  d1 16:52:17		intact pdr  d1 16:55:29		slowing down  d1 16:57:05		similar mvm's  d1 17:18:43		staring  d1 17:26:20		RUE mvm: twitching, choreiform  d1 17:27:28		RUE hemiballistic movements  d1 17:29:34		Patient Event  d1 17:30:22		RUE hemiballistic movements    EKG Findings: baseline awake HR in 120s  Habitual event?: yes    Artifacts:  Intermittent myogenic and movement artifacts were noted.    ECG:  The heart rate on single channel ECG was predominantly between 100-130 BPM.    AED:  None     EEG Summary:  Normal EEG in the awake, drowsy and asleep states.  - One habitual event consisted of sequence of abrupt, brief, intermittent right shoulder twitching/shrugging, head jerking, rapid bilateral eye blinking and staring, at times with choreiform, dystonic and hemiballistic components in the right upper extremity. Patient awake but unresponsive during the event. Amnestic to the entire event.     Impression/Clinical Correlate:   – : One habitual event recorded without abnormal EEG correlate.    Normal video EEG in awake, drowsy and asleep states.    ________________________________________    Lissett Brown MD  Attending Physician, [facility] [place]

## 2021-01-12 NOTE — PROGRESS NOTE ADULT - ASSESSMENT
20 year-old RH female with a history of recurrent UTI since age 8, BL nephrolithiasis and indeterminate 1.5cm posterior bladder lesion on CT who presents to EMU to differentiate epileptic from nonepileptic events.    One habitual event recorded thus far, suggestive of motor tic.       Recommendation:  - cvEEG to characterize seizure-like spells     - not on antiepileptic medication   - notify epilepsy to review EEG before giving any med for seizure-like activity    - seizure precaution  - anticipate discharge home tomorrow  - follow-up with me in clinic: [facility] [place] ([phone]), [facility] [place] [address]       Will continue to follow with you.   ______________________  Lissett Brown MD   [facility]  Cell: [phone]  Epilepsy Consult #: [phone] ([phone])  20 year-old RH female with a history of recurrent UTI since age 8, BL nephrolithiasis and indeterminate 1.5cm posterior bladder lesion on CT who presents to EMU to differentiate epileptic from nonepileptic events.    One habitual event recorded thus far without ictal correlate on EEG. Semiology suggestive of motor tic, but pt was unresponsive during and amnestic after the event, which are atypical features of motor tic.      Recommendation:  - cvEEG to characterize seizure-like spells     - not on antiepileptic medication   - notify epilepsy to review EEG before giving any med for seizure-like activity    - seizure precaution  - anticipate discharge home tomorrow  - follow-up with me in clinic: [facility] [place] ([phone]), [address] [place] [address]       Will continue to follow with you.   ______________________  Lissett Brown MD   [facility]  Cell: [phone]  Epilepsy Consult #: [phone] ([phone])

## 2021-01-12 NOTE — PROGRESS NOTE ADULT - PROBLEM SELECTOR PLAN 2
?POTS.  Will discuss with cardiology/EP.  Patient encouraged to increase salt and fluids. Follows with cardiology outpatient.

## 2021-01-12 NOTE — PROGRESS NOTE ADULT - SUBJECTIVE AND OBJECTIVE BOX
CC: Continuous vEEG (12 Jan 2021 09:22)    HPI:  20 year-old female with a history of recurrent UTI since age 8, B/L nephrolithiasis and indeterminate 1.5cm posterior bladder lesion on CT who presents for continuous vEEg to differentiate epileptic from nonepileptic events.     INTERVAL HPI/OVERNIGHT EVENTS: Patient seen and examined lying in bed.  Patient with orthostatic hypotension this am. Patient denies any headache, dizziness, SOB, CP, abdominal pain, nausea, vomiting, dysuria.  Other ROS reviewed and are negative.     Vital Signs Last 24 Hrs  T(C): 36.7 (12 Jan 2021 08:49), Max: 36.9 (11 Jan 2021 17:04)  T(F): 98 (12 Jan 2021 08:49), Max: 98.5 (11 Jan 2021 17:04)  HR: 99 (12 Jan 2021 08:49) (99 - 103)  BP: 101/70 (12 Jan 2021 08:49) (101/70 - 117/82)  BP(mean): --  RR: 19 (12 Jan 2021 08:49) (18 - 19)  SpO2: 100% (11 Jan 2021 17:04) (100% - 100%)  I&O's Detail    PHYSICAL EXAM:  GENERAL: NAD, well-groomed, well-developed  HEAD:  Atraumatic, Normocephalic  NECK: Supple, No JVD, Normal thyroid  NERVOUS SYSTEM:  Alert & Oriented X3, Good concentration; Motor Strength 5/5 B/L upper and lower extremities  CHEST/LUNG: Clear to auscultation bilaterally; No rales, rhonchi, wheezing, or rubs  HEART: Regular rate and rhythm; No murmurs, rubs, or gallops  ABDOMEN: Soft, Nontender, Nondistended; Bowel sounds present  EXTREMITIES:  2+ Peripheral Pulses, No clubbing, cyanosis, or edema                              12.7   5.04  )-----------( 335      ( 11 Jan 2021 16:02 )             38.4     11 Jan 2021 16:02    138    |  105    |  7.0    ----------------------------<  80     3.9     |  23.0   |  0.53     Ca    9.4        11 Jan 2021 16:02  Mg     2.1       11 Jan 2021 16:02    TPro  7.3    /  Alb  4.4    /  TBili  0.4    /  DBili  x      /  AST  23     /  ALT  14     /  AlkPhos  95     11 Jan 2021 16:02      LIVER FUNCTIONS - ( 11 Jan 2021 16:02 )  Alb: 4.4 g/dL / Pro: 7.3 g/dL / ALK PHOS: 95 U/L / ALT: 14 U/L / AST: 23 U/L / GGT: x               MEDICATIONS  (STANDING):  desipramine 10 milliGRAM(s) Oral at bedtime  enoxaparin Injectable 40 milliGRAM(s) SubCutaneous every 24 hours    MEDICATIONS  (PRN):

## 2021-01-13 ENCOUNTER — TRANSCRIPTION ENCOUNTER (OUTPATIENT)
Age: 21
End: 2021-01-13

## 2021-01-13 VITALS
TEMPERATURE: 98 F | HEART RATE: 104 BPM | RESPIRATION RATE: 18 BRPM | SYSTOLIC BLOOD PRESSURE: 101 MMHG | OXYGEN SATURATION: 98 % | DIASTOLIC BLOOD PRESSURE: 66 MMHG

## 2021-01-13 PROCEDURE — 95718 EEG PHYS/QHP 2-12 HR W/VEEG: CPT

## 2021-01-13 PROCEDURE — 99233 SBSQ HOSP IP/OBS HIGH 50: CPT

## 2021-01-13 PROCEDURE — 99239 HOSP IP/OBS DSCHRG MGMT >30: CPT

## 2021-01-13 PROCEDURE — 99222 1ST HOSP IP/OBS MODERATE 55: CPT

## 2021-01-13 PROCEDURE — 93010 ELECTROCARDIOGRAM REPORT: CPT

## 2021-01-13 RX ORDER — SODIUM CHLORIDE 9 MG/ML
1 INJECTION INTRAMUSCULAR; INTRAVENOUS; SUBCUTANEOUS
Qty: 90 | Refills: 0
Start: 2021-01-13 | End: 2021-02-11

## 2021-01-13 RX ORDER — SODIUM CHLORIDE 9 MG/ML
1000 INJECTION INTRAMUSCULAR; INTRAVENOUS; SUBCUTANEOUS ONCE
Refills: 0 | Status: COMPLETED | OUTPATIENT
Start: 2021-01-13 | End: 2021-01-13

## 2021-01-13 RX ADMIN — SODIUM CHLORIDE 1000 MILLILITER(S): 9 INJECTION INTRAMUSCULAR; INTRAVENOUS; SUBCUTANEOUS at 12:07

## 2021-01-13 RX ADMIN — SODIUM CHLORIDE 2000 MILLILITER(S): 9 INJECTION INTRAMUSCULAR; INTRAVENOUS; SUBCUTANEOUS at 12:07

## 2021-01-13 NOTE — DISCHARGE NOTE NURSING/CASE MANAGEMENT/SOCIAL WORK - PATIENT PORTAL LINK FT
You can access the FollowMyHealth Patient Portal offered by [facility] by registering at the following website: [email or web address]. By joining [facility]’s FollowMyHealth portal, you will also be able to view your health information using other applications (apps) compatible with our system.

## 2021-01-13 NOTE — PROGRESS NOTE ADULT - ASSESSMENT
20 year-old RH female with a history of recurrent UTI since age 8, BL nephrolithiasis and indeterminate 1.5cm posterior bladder lesion on CT who presents to EMU to differentiate epileptic from nonepileptic events.    Three prolonged habitual events were recorded during this EMU admission without any ictal correlate on EEG. Abnormal hyperkinetic movements could be motor tic, but patient is unresponsive during these events and amnestic afterwards, which are unexpected for tic. Age of onset is also atypical. Atypical motor tic VS conversion disorder?      Recommendation:  - DC cvEEG     - no indication for antiepileptic medication   - will refer to Movement for further eval of these abnormal hyperkinetic movements  - will be seeing EP outpt for POTS  - will be ordering MRI as outpt  - seizure precaution  - anticipate discharge home today  - no driving  - follow-up with me in clinic: [facility] [place] ([phone]), [address] [place] [address]       Please contact Epilepsy with further question if needed.   ______________________  Lissett Brown MD   [facility]  Cell: [phone]  Epilepsy Consult #: [phone] ([phone])

## 2021-01-13 NOTE — DISCHARGE NOTE PROVIDER - NSDCFUADDAPPT_GEN_ALL_CORE_FT
Follow up with PMD, Dr. Brown and Dr. Tirado (EP).  [facility] [place] ([phone]), [address] [place] [address]

## 2021-01-13 NOTE — PROGRESS NOTE ADULT - SUBJECTIVE AND OBJECTIVE BOX
[facility]                                                                     Lissett Brown MD                                              Epilepsy Consult #: [phone] ([phone])                                               Office: [address] [place] [address], [address]                                                 Phone: [phone]; Fax: [phone]                            ==============================================    EPILEPSY FOLLOW-UP NOTE      INTERVAL HISTORY:  Two more habitual events captured over last 24hrs without abnormal EEG correlate. Baseline sinus tachy 100-120s. Positive orthostatics. Had prior negative cardiac workup. Likely POTS.    MEDICATIONS:   desipramine 10 milliGRAM(s) Oral at bedtime  enoxaparin Injectable 40 milliGRAM(s) SubCutaneous every 24 hours    Vital Signs Last 24 Hrs  T(C): 36.7 (13 Jan 2021 08:51), Max: 36.8 (12 Jan 2021 17:20)  T(F): 98 (13 Jan 2021 08:51), Max: 98.2 (12 Jan 2021 17:20)  HR: 103 (13 Jan 2021 08:51) (103 - 112)  BP: 100/68 (13 Jan 2021 08:51) (100/68 - 101/70)  BP(mean): --  RR: 20 (13 Jan 2021 08:51) (18 - 20)  SpO2: 98% (13 Jan 2021 08:51) (97% - 98%)    GENERAL PHYSICAL EXAM:  GEN: no distress   HEENT: NCAT, OP clear  EYES: sclera white, conjunctiva clear, no nystagmus  NECK: supple  CV: RRR, no murmur     		  PULM: CTAB, no wheezing  ABD: soft, +BS, NT  EXT: peripheral pulse intact, no cyanosis  MSK: muscle tone and strength normal  SKIN: warm, dry, no rash or lesion on exposed skin     NEUROLOGICAL EXAM:  Mental Status  Orientation: alert and oriented to person, place, time, and situation   Language: clear and fluent, intact comprehension and repetition, intact naming and reading    Cranial Nerves  II: full visual fields intact   III, IV, VI: PERRL, EOMI  V, VII: facial sensation and movement intact and symmetric   VIII: hearing intact   IX, X: uvula midline, soft palate elevates normally   XI: BL shoulder shrug intact   XII: tongue midline    Motor  5/5 BUE and BLE                 Tone and bulk are normal in upper and lower limbs  No pronator drift    Sensation  Intact to light touch and pinprick in all 4 EXTs    Reflex  2+ in BL biceps and patella                                    Plantar responses downward bilaterally    Coordination  Normal FTN bilaterally    Gait  Deferred       LABS:                          12.7   5.04  )-----------( 335      ( 11 Jan 2021 16:02 )             38.4     01-11    138  |  105  |  7.0<L>  ----------------------------<  80  3.9   |  23.0  |  0.53    Ca    9.4      11 Jan 2021 16:02  Mg     2.1     01-11    TPro  7.3  /  Alb  4.4  /  TBili  0.4  /  DBili  x   /  AST  23  /  ALT  14  /  AlkPhos  95  01-11    CAPILLARY BLOOD GLUCOSE        LIVER FUNCTIONS - ( 11 Jan 2021 16:02 )  Alb: 4.4 g/dL / Pro: 7.3 g/dL / ALK PHOS: 95 U/L / ALT: 14 U/L / AST: 23 U/L / GGT: x               OTHER IMAGING AND STUDIES:    EMU 1/11-1/13/21:  EEG Summary:  Normal EEG in the awake, drowsy and asleep states.  - Three habitual event consisted of mixture of abrupt, brief, intermittent right shoulder twitching/shrugging, head jerking, rapid bilateral eye blinking and staring, at times with choreiform, dystonic and hemiballistic components in the right upper extremity. Patient awake but unresponsive during the event. Amnestic to the entire event. Each event roughly 30-60m in duration.    Impression/Clinical Correlate:  1/11 – 1/12: One habitual event recorded without abnormal EEG correlate.  1/12 – 1/13: Two habitual events recorded without abnormal EEG correlate.    Normal video EEG in awake, drowsy and asleep states. Three prolonged habitual events were recorded during this EMU admission without any ictal correlate on EEG. Abnormal hyperkinetic movements could be motor tic, but patient is unresponsive during these events and amnestic afterwards, which are unexpected for tic. Age of onset is also atypical. Etiology of these events remain unclear. Consider conversion disorder.

## 2021-01-13 NOTE — DISCHARGE NOTE PROVIDER - PROVIDER TOKENS
FREE:[LAST:[PMD],PHONE:[(   )    -],FAX:[(   )    -]],PROVIDER:[TOKEN:[[id number]]],PROVIDER:[TOKEN:[[id number]]]

## 2021-01-13 NOTE — DISCHARGE NOTE PROVIDER - NSDCMRMEDTOKEN_GEN_ALL_CORE_FT
desipramine 10 mg oral tablet: 1 tab(s) orally once a day (at bedtime)  Sodium Chloride 1 g oral tablet: 1 tab(s) orally 3 times a day

## 2021-01-13 NOTE — DISCHARGE NOTE PROVIDER - NSDCCPCAREPLAN_GEN_ALL_CORE_FT
PRINCIPAL DISCHARGE DIAGNOSIS  Diagnosis: Other hyperkinetic disorders  Assessment and Plan of Treatment: Abnormal Hyperkinetic movements  Follow up with primary doctor, Dr. Brown, and movement doctor.      SECONDARY DISCHARGE DIAGNOSES  Diagnosis: POTS (postural orthostatic tachycardia syndrome)  Assessment and Plan of Treatment: Increase fluid intake.  Continue Salt tabs.  Follow up with EP.

## 2021-01-13 NOTE — EEG REPORT - NS EEG TEXT BOX
[facility]  Epilepsy Monitoring Unit Report    [facility]: [address], [place] [address], Phone [phone]  [facility]: [address] [place] [address], Phone [phone]  [place] Office: [address], [address], [place] [address] Phone [phone]    [facility]: [address] [place] [address], Phone [phone]  [place] Office: [address] [place] [address], Phone [phone]    Patient Name: Gloria Trent    Age: 20 year, : 2000  Patient ID: -, MRN #: -, Russell: -    Physician Ordering Inpatient EEG: Swati Lucas  Referral Source to EMU: elective admission – Dr. Brown    EMU Study Started: 14:09 on 21    EMU Study Ended: pending discharge    Study Information:    EEG Recording Technique:  The patient underwent continuous Video-EEG monitoring, using Telemetry System hardware on the XLTek Digital System. EEG and video data were stored on a computer hard drive with important events saved in digital archive files. The material was reviewed by a physician (electroencephalographer / epileptologist) on a daily basis. Ron and seizure detection algorithms were utilized and reviewed. An EEG Technician attended to the patient, and was available throughout daytime work hours.  The epilepsy center neurologist was available in person or on call 24-hours per day.    EEG Placement and Labeling of Electrodes:  The EEG was performed utilizing 20 channel referential EEG connections (coronal over temporal over parasagittal montage) using all standard 10-20 electrode placements with EKG, with additional electrodes placed in the inferior temporal region using the modified 10-10 montage electrode placements for elective admissions, or if deemed necessary. Recording was at a sampling rate of 256 samples per second per channel. Time synchronized digital video recording was done simultaneously with EEG recording. A low light infrared camera was used for low light recording.     History:  This is a 20 year-old RH female with a history of recurrent UTI since age 8, BL nephrolithiasis and indeterminate 1.5cm posterior bladder lesion on CT who presents to EMU to differentiate epileptic from nonepileptic events    October of this year, began to develop intermittent episodes of BL eye blinking while maintaining awareness and still able to speak, though struggles. In November, behavior changed to staring and unresponsiveness, but still able to hear and somewhat aware of her surroundings. Then last few days, developed unresponsiveness, facial grimacing, intermittent twitching/jerking of different parts of the body, back arching, hands stiffened and positioned in contracted states. These episodes may last up to 2 hours, with gradual return of movements and then speech. Mother recorded all 3 types of behaviors on video. Reviewed all 3 in office today. Has been admitted to [facility] for these episodes. Discharged without AED.     When asked about stress in life, admits to emotional stress and exhaust of seemingly endless workup, treatment and doctor visits for recurrent UTI, pain from BL nephrolithiasis, and recent discovery of the bladder lesion. Pending cystoscopy next week. Parents  when she was 6yo, and she sometimes feel she needs to be strong for her mother and be the emotional support for her mother.     SEIZURE RISK FACTORS:  Patient was a product of normal pregnancy and delivery. No history of febrile seizure, TBI, CNS infection, or FH of seizures.    CURRENT AED:  none    PREVIOUS AED:  none    IMAGING:   CTH: prominent ventricles     NEUROPHYSIOLOGY:  Per pt, rEEG at [facility] was unremarkable.     NEUROPSYCHOLOGY:   None    Home Antiepileptic Medication and Device  none    Interpretation:    Start Date: 2021 – Day 1                                Start Time – 14:09      Duration – 17hr 49m    Daily EEG Visual Analysis  Findings: The background was continuous, spontaneously variable and reactive. During wakefulness, the posterior dominant rhythm consisted of symmetric, well-modulated 10-11 Hz activity, with amplitude to 80 uV, that attenuated to eye opening.     Background Slowing:  No generalized background slowing was present.    Focal Slowing:   None were present.    Sleep Background:  Drowsiness was characterized by fragmentation, attenuation, and slowing of the background activity.      Other Non-Epileptiform Findings:  None were present.    Interictal Epileptiform Activity:   None were present.    Events:  === Event #1 ===  Time: intermittently from about 16:44 to 17:50		  Seizure type: probably non-epileptic  Electrographic onset location: no ictal rhythm  Electrographic evolution: no ictal rhythm  State at onset: awake    Clinical/EEG Findings  No abnormal EEG correlate before, during and after the event. Baseline awake background with PDR.    d1 16:25:19		awake   d1 16:44:25		EVENT #1  d1 16:44:27		looking down at phone  d1 16:44:29		(pt next day stated she doesn't recall this event; not aware she had the twitching)  d1 16:44:33		myoclonic twitching of right shoulder  d1 16:44:42		R shoulder twitch  d1 16:44:49		intermittent twitching of R shoulder  d1 16:44:54		awake  d1 16:44:58		staring  d1 16:45:08		motor sequence of abrupt, intermittent right should twitching/shrugging, head   jerking, rapid eye blinking  d1 16:45:43		RN enters room and pushes button  d1 16:45:45		Patient Event  d1 16:45:53		RN asking date; pt doesn't respond  d1 16:46:30		RN calling pt's name; pt doesn't respond  d1 16:46:42		RN reads  going to 118  d1 16:46:51		repetitive mvms: eye blinking, R shoulder twitching/shrugging, head jerking  d1 16:47:07		not responding to RN's name calling  d1 16:47:18		behavioral arrest, staring  d1 16:47:24		temporarily stopped  d1 16:47:39		similar mvm's  d1 16:48:07		staring, beh arrest, not responding  d1 16:49:45		staring  d1 16:50:18		similar mvm's  d1 16:50:43		blanket uncovered  d1 16:52:17		intact pdr  d1 16:55:29		slowing down  d1 16:57:05		similar mvm's  d1 17:18:43		staring  d1 17:26:20		RUE mvm: twitching, choreiform  d1 17:27:28		RUE hemiballistic movements  d1 17:29:34		Patient Event  d1 17:30:22		RUE hemiballistic movements    EKG Findings: baseline awake HR in 120s  Habitual event?: yes    Artifacts:  Intermittent myogenic and movement artifacts were noted.    ECG:  The heart rate on single channel ECG was predominantly between 100-130 BPM.    AED:  None     Start Date: 2021 – Day 2                                       Start Time – 08:00      Duration – 24hr    Daily EEG Visual Analysis  FINDINGS:  The background, artifacts and HR on single channel ECG were similar as previous day.    Interictal Epileptiform Activity:   None were present.    Events:  === Events #2-3 ===  Time: 16:14 & 01:01    duration of each event: ~30m 		  Seizure type: probably non-epileptic  Electrographic onset location: no ictal rhythm  Electrographic evolution: no ictal rhythm  State at onset: awake    Clinical/EEG Findings  No abnormal EEG correlate before, during and after the event. Baseline awake background with PDR.    d1 16:14:19  		awake  d1 16:14:24  		EVENT #2  d1 16:14:28  		R EXT twitch  d1 16:14:56  		PCA enters to take BP  d1 16:15:26  		staring  d1 16:15:35  		eye blinking  d1 16:16:15  		opened mouth to take temp  d1 16:16:55  		staring, unresponsive to RN  d1 16:17:01  		intact PDR  d1 16:17:34  		staring  d1 16:17:36  		Patient Event  d1 16:17:36  		RN pressed button  d1 16:18:19  		blinking, twitching  d1 16:18:53  		intact PDR  d1 16:19:57  		staring, subtle R shoulder twitching  d1 16:36:15  		seems to resolve    d2 01:01:01  		awake  d2 01:01:07  		EVENT #3  d2 01:01:12  		pt pressed button herself  d2 01:01:15  		Patient Event  d2 01:01:41  		PCA in the room  d2 01:01:41  		blinking twitching  d2 01:33:02  		RUE hemiballistic-like mvm, but gentle  d2 01:34:00  		twitch  d2 01:34:43  		RN pressed button  d2 01:34:46  		Patient Event  d2 01:35:10  		mvm w RUE  d2 01:44:07  		mvm resolved as pt falling asleep    EKG Findings: baseline awake HR in 120s  Habitual event?: yes    AEDs:  none    EEG Summary:  Normal EEG in the awake, drowsy and asleep states.  - Three habitual event consisted of mixture of abrupt, brief, intermittent right shoulder twitching/shrugging, head jerking, rapid bilateral eye blinking and staring, at times with choreiform, dystonic and hemiballistic components in the right upper extremity. Patient awake but unresponsive during the event. Amnestic to the entire event. Each event roughly 30-60m in duration.    Impression/Clinical Correlate:   – : One habitual event recorded without abnormal EEG correlate.   – : Two habitual events recorded without abnormal EEG correlate.    Normal video EEG in awake, drowsy and asleep states. Three prolonged habitual events were recorded during this EMU admission without any ictal correlate on EEG. Abnormal hyperkinetic movements could be motor tic, but patient is unresponsive during these events and amnestic afterwards, which are unexpected for tic. Age of onset is also atypical. Etiology of these events remain unclear. Consider conversion disorder.     No antiepileptic medication at discharge.    ________________________________________    Lissett Brown MD  Attending Physician, [facility] [place]

## 2021-01-13 NOTE — CONSULT NOTE ADULT - ASSESSMENT
Gloria Trent is a 20 year old female with history of recurrent UTI, bilateral nephrolithiasis, and history of syncope with question of seizure presenting for continuous video EEG which was negative for seizure activity during concerning events who was noted to have tachycardia with standing with concern for POTS.        Recommendations:  - IVF bolus of 1-2 L and repeat orthostatics  - ECG  - Consider Salt Tabs  - Encouraged patient to increase hydration with electrolyte containing fluid (Gatorade, Powerade, etc).  - Aggressive fluid hydration  - Graded exercise regimen  - Outpatient 24-48 HR Holter monitor to rule out atrial tachycardia    INCOMPLETE NOTE - PATIENT NOT SEEN Gloria Trent is a 20 year old female with history of recurrent UTI, bilateral nephrolithiasis, and history of syncope with question of seizure presenting for continuous video EEG which was negative for seizure activity during concerning events who was noted to have tachycardia with standing for whom EP is consulted for consideration of evaluation of POTS.    Her history is suggestive of hypovolemia and possible dehydration. She reports having dark urine and objectively, her orthostatics are positive. She may have hypovolemia rather than POTS and so I recommended that the patient pursue aggressive rehydration, until her urine is clear. I recommended using electrolyte containing fluid such as Gatorade and Powerade. I also counseled her to increase salt intake in an effort to increase her blood pressure and improve perfusion. One could consider using salt tabs. She could also benefit with compression stockings. I asked her to try to increase physical activity and exercise as much as she can tolerate.    If after these interventions she does not have improvement, we could consider using beta blockers or CCB to reduce her HR. We discussed possibly getting another evaluation at the [facility] [place] [facility] if she has no improvement with the above intervenions.    Recommendations:  - Consider Salt Tabs  - Encouraged patient to increase hydration with electrolyte containing fluid (Gatorade, Powerade, etc).  - Aggressive fluid hydration  - Graded exercise regimen  - Review outpatient Holter monitoring    Outpatient EP follow up in 1 month.    Thank you for this consultation. EP will sign off. Please contact EP team with any questions.    Omid Tirado MD  Clinical Cardiac Electrophysiology

## 2021-01-13 NOTE — DISCHARGE NOTE PROVIDER - CARE PROVIDER_API CALL
IZABELA,   Phone: (   )    -  Fax: (   )    -  Follow Up Time:     Lissett Brown)  Neurology  [address] [place] [address]  Phone: [phone]  Fax: [phone]  Follow Up Time:     Omid Tirado)  Cardiology; Internal Medicine  [address], [address]  [place] [address]  Phone: [phone]  Fax: [phone]  Follow Up Time:

## 2021-01-13 NOTE — CONSULT NOTE ADULT - SUBJECTIVE AND OBJECTIVE BOX
INCOMPLETE NOTE - PATIENT NOT SEEN                                                             [facility]                                                               CARDIAC ELECTROPHYSIOLOGY                                                       [facility] [place]                                                      Office: [address], [id number]                                                       Telephone: [phone]. Fax:[phone]    HPI:  Gloria Trent is a 20 year old female with history of recurrent UTI, bilateral nephrolithiasis, and history of syncope with question of seizure who presented for continuous video EEG.    Per chart review, the patient had intermittent episodes of bilateral eye blinking while maintaining awareness and still able to speak. Her behavior changed in November when she started to stare and become unresponsive. She then had periods of unresponsiveness, facial grimacing, intermittent twitching/jerking of different parts of the body associated with body/back arching and hands stiffening in a contracted state. Some episodes lasted 2 hours. She was brought in for video EEG to evaluate epileptic from non-epileptic events.    During her admission she had three events (abnormal hyperkinetic movements associated with unresponsiveness and amnesia afterwards) without associated abnormalities on EEG. Orthostatics were performed which were positive with vitals changing from 101/71;  to 88/60;  bpm when going from laying to standing. She reportedly has had a cardiac work up which has been unrevealing.    Of note, she had a TSH in 12/2020 with her PCP which was normal.    PAST MEDICAL & SURGICAL HISTORY:  MVP (mitral valve prolapse)  Frequent UTI    REVIEW OF SYSTEMS: As per HPI. Remaining 10 point ROS were reviewed and are negative.    MEDICATIONS  (STANDING):  desipramine 10 milliGRAM(s) Oral at bedtime  enoxaparin Injectable 40 milliGRAM(s) SubCutaneous every 24 hours  sodium chloride 0.9% Bolus 1000 milliLiter(s) IV Bolus once    Allergies  No Known Allergies    SOCIAL HISTORY:  Denied EtOH, tobacco, illicit drugs.    FAMILY HISTORY:  No pertinent family history in first degree relatives    Vital Signs Last 24 Hrs  T(C): 36.7 (13 Jan 2021 08:51), Max: 36.8 (12 Jan 2021 17:20)  T(F): 98 (13 Jan 2021 08:51), Max: 98.2 (12 Jan 2021 17:20)  HR: 103 (13 Jan 2021 08:51) (103 - 112)  BP: 100/68 (13 Jan 2021 08:51) (100/68 - 101/70)  BP(mean): --  RR: 20 (13 Jan 2021 08:51) (18 - 20)  SpO2: 98% (13 Jan 2021 08:51) (97% - 98%)    Physical Exam:  Appearance: Normal, well nourished	  HEENT:   Normal oral mucosa, PERRL, EOMI, sclera non-icteric	  Lymphatic: No cervical lymphadenopathy  Cardiovascular: Normal S1 S2, No JVD, No cardiac murmurs, No carotid bruits, No peripheral edema  Respiratory: Lungs clear to auscultation, unlabored, no rhonchi/rales/wheezes  Psychiatry: A & O x 3, Mood & affect appropriate  Gastrointestinal:  Soft, Non-tender, + BS, no bruits	  Skin: No rashes, No ecchymoses, No cyanosis  Neurologic: Grossly non-focal with full strength in all four extremities  Extremities: Normal range of motion, No clubbing, cyanosis or edema  Vascular: Peripheral pulses palpable 2+ bilaterally    LABS:             12.7   5.04  )-----------( 335      ( 11 Jan 2021 16:02 )             38.4   01-11    138  |  105  |  7.0<L>  ----------------------------<  80  3.9   |  23.0  |  0.53    Ca    9.4      11 Jan 2021 16:02  Mg     2.1     01-11    TPro  7.3  /  Alb  4.4  /  TBili  0.4  /  DBili  x   /  AST  23  /  ALT  14  /  AlkPhos  95  01-11  LIVER FUNCTIONS - ( 11 Jan 2021 16:02 )  Alb: 4.4 g/dL / Pro: 7.3 g/dL / ALK PHOS: 95 U/L / ALT: 14 U/L / AST: 23 U/L / GGT: x           RADIOLOGY & ADDITIONAL STUDIES:  < from: CT Abdomen and Pelvis w/wo IV Cont (11.28.20 @ 16:09) >  IMPRESSION:  A nonobstructing 1.1 cm right lower pole calculus.    < from: US Renal (10.26.20 @ 18:34) >  IMPRESSION:  Nonobstructing right renal calculus.    < from: Cardiac Treadmill Stress Test (Non Imaging) (11.23.20 @ 06:46) >  STRESS TEST IMPRESSIONS:  Exercise capacity: 9 METS, Fair for age and gender. 98% of MPHR.  Chest Pain: No chest pain with exercise.  Symptom: No Symptom.  HR Response: Appropriate.  BP Response: Appropriate.  Heart Rhythm: Sinus Tachycardia.  ECG Abnormalities: There were no diagnostic changes.  Arrhythmia: None.                                                          [facility]                                                               CARDIAC ELECTROPHYSIOLOGY                                                       [facility] [place]                                                      Office: [address], [facility]                                                       Telephone: [phone]. Fax:[phone]    HPI:  Gloria Trent is a 20 year old female with history of recurrent UTI, bilateral nephrolithiasis, and history of syncope with question of seizure who presented for continuous video EEG.    The patient states that she has had 1 syncopal event in her life. It was early in 2020 when she was in a car with her boyfriend and did not feel well. She had a headache and when she got out of the car, she for a split second felt dizzy and then had syncope. When she awoke she "did not feel right." She has never had a repeat episode like that. Over the past few months she has had increasing fatigue and has noted some palpitations. 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 A few months ago she noted having worsening fatigue with even walking to the bathroom. Her episodes transitioned to those where she was unable to speak and became unresponsive. This prompted Neurologic evaluation and so she was brought in for video EEG.    Of note, she had a TSH in 12/2020 with her PCP which was normal. She reports that her urine is generally dark although she drinks about 3 bottles of water every day. She has had a poor appetite lately. She also had nephrolithiasis and recurrent UTIs which is improving.    During her admission she had three events (abnormal hyperkinetic movements associated with unresponsiveness and amnesia afterwards) without associated abnormalities on EEG. Orthostatics were performed which were positive with vitals changing from 101/71;  to 88/60;  bpm when going from laying to standing.    She sees Dr. Negrete at [facility] and has had a work up with heart monitoring, echo, and stress test which were all reportedly normal.    ECG: Sinus tachycardia with inferior and anterolateral T wave inversions.    PAST MEDICAL & SURGICAL HISTORY:  MVP (mitral valve prolapse)  Frequent UTI    REVIEW OF SYSTEMS: As per HPI. Remaining 10 point ROS were reviewed and are negative.    MEDICATIONS  (STANDING):  desipramine 10 milliGRAM(s) Oral at bedtime  enoxaparin Injectable 40 milliGRAM(s) SubCutaneous every 24 hours  sodium chloride 0.9% Bolus 1000 milliLiter(s) IV Bolus once    Allergies  No Known Allergies    SOCIAL HISTORY:  Denied EtOH, tobacco, illicit drugs.    FAMILY HISTORY:  Paternal Father with alcoholism  Paternal Grandfather with MI in 50s  Paternal Aunt with MI in 40s    Vital Signs Last 24 Hrs  T(C): 36.7 (13 Jan 2021 08:51), Max: 36.8 (12 Jan 2021 17:20)  T(F): 98 (13 Jan 2021 08:51), Max: 98.2 (12 Jan 2021 17:20)  HR: 103 (13 Jan 2021 08:51) (103 - 112)  BP: 100/68 (13 Jan 2021 08:51) (100/68 - 101/70)  BP(mean): --  RR: 20 (13 Jan 2021 08:51) (18 - 20)  SpO2: 98% (13 Jan 2021 08:51) (97% - 98%)    Physical Exam:  Appearance: Normal, well nourished, thin  HEENT: PERRL, EOMI, sclera non-icteric	  Cardiovascular: Normal S1 S2, No JVD, No cardiac murmurs, No carotid bruits, No peripheral edema  Respiratory: Lungs clear to auscultation, unlabored, no rhonchi/rales/wheezes  Psychiatry: A & O x 3, Mood & affect appropriate  Gastrointestinal:  Soft, Non-tender, + BS, no bruits	  Skin: No rashes, No ecchymoses, No cyanosis  Neurologic: Grossly non-focal with full strength in all four extremities  Extremities: Normal range of motion, No clubbing, cyanosis or edema    LABS:             12.7   5.04  )-----------( 335      ( 11 Jan 2021 16:02 )             38.4   01-11    138  |  105  |  7.0<L>  ----------------------------<  80  3.9   |  23.0  |  0.53    Ca    9.4      11 Jan 2021 16:02  Mg     2.1     01-11    TPro  7.3  /  Alb  4.4  /  TBili  0.4  /  DBili  x   /  AST  23  /  ALT  14  /  AlkPhos  95  01-11  LIVER FUNCTIONS - ( 11 Jan 2021 16:02 )  Alb: 4.4 g/dL / Pro: 7.3 g/dL / ALK PHOS: 95 U/L / ALT: 14 U/L / AST: 23 U/L / GGT: x           RADIOLOGY & ADDITIONAL STUDIES:  < from: CT Abdomen and Pelvis w/wo IV Cont (11.28.20 @ 16:09) >  IMPRESSION:  A nonobstructing 1.1 cm right lower pole calculus.    < from: US Renal (10.26.20 @ 18:34) >  IMPRESSION:  Nonobstructing right renal calculus.    < from: Cardiac Treadmill Stress Test (Non Imaging) (11.23.20 @ 06:46) >  STRESS TEST IMPRESSIONS:  Exercise capacity: 9 METS, Fair for age and gender. 98% of MPHR.  Chest Pain: No chest pain with exercise.  Symptom: No Symptom.  HR Response: Appropriate.  BP Response: Appropriate.  Heart Rhythm: Sinus Tachycardia.  ECG Abnormalities: There were no diagnostic changes.  Arrhythmia: None.

## 2021-01-13 NOTE — DISCHARGE NOTE PROVIDER - HOSPITAL COURSE
20 year-old female with a history of recurrent UTI since age 8, B/L nephrolithiasis and indeterminate 1.5cm posterior bladder lesion on CT who presents for continuous vEEg to differentiate epileptic from nonepileptic events. In October 2020, patient had intermittent episodes of B/L eye blinking while maintaining awareness and still able to speak, though struggles. In November, behavior changed to staring and unresponsiveness, but still able to hear and somewhat aware of her surroundings. Then, developed unresponsiveness, facial grimacing, intermittent twitching/jerking of different parts of the body, back arching, hands stiffened and positioned in contracted states. These episodes may last up to 2 hours, with gradual return of movements and then speech. Has been admitted to [facility] for these episodes. Discharged without AED. Of note, patient has had episodes of chest pain intermittently, and is by cardiology outpatient.   Patient completed vEEG. Dr. Brown following, likely Abnormal hyperkinetic movements.  Patient to follow up with movement doctor as outpatient.  EP consulted for possible POTS.  Recommended fluids and salt tabs.  Patient stable for discharge to home today.  Patient to follow up with primary doctor, Dr. Brown, movement doctor, and EP.     Vital Signs Last 24 Hrs  T(C): 36.7 (13 Jan 2021 08:51), Max: 36.8 (12 Jan 2021 17:20)  T(F): 98 (13 Jan 2021 08:51), Max: 98.2 (12 Jan 2021 17:20)  HR: 103 (13 Jan 2021 08:51) (103 - 112)  BP: 100/68 (13 Jan 2021 08:51) (100/68 - 101/70)  BP(mean): --  RR: 20 (13 Jan 2021 08:51) (18 - 20)  SpO2: 98% (13 Jan 2021 08:51) (97% - 98%)    PHYSICAL EXAM:  GENERAL: NAD, well-groomed, well-developed  NECK: Supple, No JVD, Normal thyroid  NERVOUS SYSTEM:  Alert & Oriented X3, Good concentration; Motor Strength 5/5 B/L upper and lower extremities  CHEST/LUNG: Clear to auscultation bilaterally; No rales, rhonchi, wheezing, or rubs  HEART: Regular rate and rhythm; No murmurs, rubs, or gallops  ABDOMEN: Soft, Nontender, Nondistended; Bowel sounds present  EXTREMITIES:  2+ Peripheral Pulses, No clubbing, cyanosis, or edema     20 year-old female with a history of recurrent UTI since age 8, B/L nephrolithiasis and indeterminate 1.5cm posterior bladder lesion on CT , had cystoscopy - no lesion  seen  who presents for continuous vEEg to differentiate epileptic from nonepileptic events. In October 2020, patient had intermittent episodes of B/L eye blinking while maintaining awareness and still able to speak, though struggles. In November, behavior changed to staring and unresponsiveness, but still able to hear and somewhat aware of her surroundings. Then, developed unresponsiveness, facial grimacing, intermittent twitching/jerking of different parts of the body, back arching, hands stiffened and positioned in contracted states. These episodes may last up to 2 hours, with gradual return of movements and then speech. Has been admitted to [facility] for these episodes. Discharged without AED. Of note, patient has had episodes of chest pain intermittently, and is by cardiology outpatient.   Patient completed vEEG. Dr. Brown following, seizure r/o ,  likely Abnormal hyperkinetic movements. Patient noted with episodes of tachycardia , orthostatic BP with HR increase > 30 bpm on changing position from laying to standing . Patient states she had head injury about 1 yr ago while skiing, recent extensive blood work done - nl including TFT , except Tiffanie's Granados Ab + .Patient to follow up with movement doctor as outpatient as per Dr Brown   EP consulted for possible POTS vs Ventricular tachycardia ( Dr Tirado ) , possible Holter monitor Recommended fluids and salt tabs.  Patient stable for discharge to home today.  Patient to follow up with primary doctor, Dr. Brown, movement doctor,  EP. Patient advised to see psychologist also .     Vital Signs Last 24 Hrs  T(C): 36.7 (13 Jan 2021 08:51), Max: 36.8 (12 Jan 2021 17:20)  T(F): 98 (13 Jan 2021 08:51), Max: 98.2 (12 Jan 2021 17:20)  HR: 103 (13 Jan 2021 08:51) (103 - 112)  BP: 100/68 (13 Jan 2021 08:51) (100/68 - 101/70)  BP(mean): --  RR: 20 (13 Jan 2021 08:51) (18 - 20)  SpO2: 98% (13 Jan 2021 08:51) (97% - 98%)    PHYSICAL EXAM:  GENERAL: NAD, well-groomed, well-developed  NECK: Supple, No JVD, Normal thyroid  NERVOUS SYSTEM:  Alert & Oriented X3, Good concentration; Motor Strength 5/5 B/L upper and lower extremities  CHEST/LUNG: Clear to auscultation bilaterally; No rales, rhonchi, wheezing, or rubs  HEART: Regular rate and rhythm; No murmurs, rubs, or gallops  ABDOMEN: Soft, Nontender, Nondistended; Bowel sounds present  EXTREMITIES:  2+ Peripheral Pulses, No clubbing, cyanosis, or edema    Patient seen and examined , no complaints , noted to be tachycardic ,   no seizure events noted on EEG , last night noted with habitual movements .   Above reviewed with NP ( Sara )   agree with above .   Stable to d/c Home after 2 lit NS .     More than 30 min spent with patient, nurse , dr Brown , Dr Tirado , mother .   No driving .

## 2021-01-13 NOTE — DISCHARGE NOTE PROVIDER - CARE PROVIDERS DIRECT ADDRESSES
,DirectAddress_Unknown,[email or web address],DirectAddress_Unknown

## 2021-01-16 PROCEDURE — 85025 COMPLETE CBC W/AUTO DIFF WBC: CPT

## 2021-01-16 PROCEDURE — 93005 ELECTROCARDIOGRAM TRACING: CPT

## 2021-01-16 PROCEDURE — 95700 EEG CONT REC W/VID EEG TECH: CPT

## 2021-01-16 PROCEDURE — 95714 VEEG EA 12-26 HR UNMNTR: CPT

## 2021-01-16 PROCEDURE — 83735 ASSAY OF MAGNESIUM: CPT

## 2021-01-16 PROCEDURE — 80053 COMPREHEN METABOLIC PANEL: CPT

## 2021-01-16 PROCEDURE — 86769 SARS-COV-2 COVID-19 ANTIBODY: CPT

## 2021-01-16 PROCEDURE — 95711 VEEG 2-12 HR UNMONITORED: CPT

## 2021-01-19 ENCOUNTER — NON-APPOINTMENT (OUTPATIENT)
Age: 21
End: 2021-01-19

## 2021-01-19 ENCOUNTER — APPOINTMENT (OUTPATIENT)
Dept: NEUROLOGY | Facility: CLINIC | Age: 21
End: 2021-01-19
Payer: MEDICAID

## 2021-01-19 VITALS
DIASTOLIC BLOOD PRESSURE: 75 MMHG | TEMPERATURE: 98.1 F | SYSTOLIC BLOOD PRESSURE: 104 MMHG | HEIGHT: 64 IN | WEIGHT: 115 LBS | HEART RATE: 95 BPM | OXYGEN SATURATION: 99 % | BODY MASS INDEX: 19.63 KG/M2

## 2021-01-19 DIAGNOSIS — R26.81 UNSTEADINESS ON FEET: ICD-10-CM

## 2021-01-19 DIAGNOSIS — R25.9 UNSPECIFIED ABNORMAL INVOLUNTARY MOVEMENTS: ICD-10-CM

## 2021-01-19 PROBLEM — N39.0 URINARY TRACT INFECTION, SITE NOT SPECIFIED: Chronic | Status: ACTIVE | Noted: 2021-01-11

## 2021-01-19 PROBLEM — I34.1 NONRHEUMATIC MITRAL (VALVE) PROLAPSE: Chronic | Status: ACTIVE | Noted: 2021-01-11

## 2021-01-19 PROCEDURE — 99072 ADDL SUPL MATRL&STAF TM PHE: CPT

## 2021-01-19 PROCEDURE — 99215 OFFICE O/P EST HI 40 MIN: CPT

## 2021-01-20 ENCOUNTER — LABORATORY RESULT (OUTPATIENT)
Age: 21
End: 2021-01-20

## 2021-01-20 ENCOUNTER — APPOINTMENT (OUTPATIENT)
Dept: RHEUMATOLOGY | Facility: CLINIC | Age: 21
End: 2021-01-20
Payer: MEDICAID

## 2021-01-20 VITALS
HEIGHT: 64 IN | BODY MASS INDEX: 20.66 KG/M2 | HEART RATE: 126 BPM | SYSTOLIC BLOOD PRESSURE: 100 MMHG | DIASTOLIC BLOOD PRESSURE: 60 MMHG | WEIGHT: 121 LBS | RESPIRATION RATE: 15 BRPM | TEMPERATURE: 98 F | OXYGEN SATURATION: 99 %

## 2021-01-20 DIAGNOSIS — R56.9 UNSPECIFIED CONVULSIONS: ICD-10-CM

## 2021-01-20 PROCEDURE — 99072 ADDL SUPL MATRL&STAF TM PHE: CPT

## 2021-01-20 PROCEDURE — 99203 OFFICE O/P NEW LOW 30 MIN: CPT

## 2021-01-20 RX ORDER — CEFADROXIL 500 MG/1
500 CAPSULE ORAL TWICE DAILY
Qty: 20 | Refills: 0 | Status: DISCONTINUED | COMMUNITY
Start: 2020-12-05 | End: 2021-01-20

## 2021-01-20 NOTE — PHYSICAL EXAM
[General Appearance - Alert] : alert [General Appearance - In No Acute Distress] : in no acute distress [General Appearance - Well Nourished] : well nourished [General Appearance - Well Developed] : well developed [Sclera] : the sclera and conjunctiva were normal [PERRL With Normal Accommodation] : pupils were equal in size, round, and reactive to light [Extraocular Movements] : extraocular movements were intact [Outer Ear] : the ears and nose were normal in appearance [Neck Appearance] : the appearance of the neck was normal [Jugular Venous Distention Increased] : there was no jugular-venous distention [Auscultation Breath Sounds / Voice Sounds] : lungs were clear to auscultation bilaterally [Heart Rate And Rhythm] : heart rate was normal and rhythm regular [Heart Sounds Gallop] : no gallops [Heart Sounds] : normal S1 and S2 [Murmurs] : no murmurs [Heart Sounds Pericardial Friction Rub] : no pericardial rub [Bowel Sounds] : normal bowel sounds [Abdomen Soft] : soft [Abdomen Tenderness] : non-tender [No CVA Tenderness] : no ~M costovertebral angle tenderness [No Spinal Tenderness] : no spinal tenderness [Abnormal Walk] : normal gait [Musculoskeletal - Swelling] : no joint swelling seen [Nail Clubbing] : no clubbing  or cyanosis of the fingernails [Motor Tone] : muscle strength and tone were normal [] : no rash [Skin Lesions] : no skin lesions [Sensation] : the sensory exam was normal to light touch and pinprick [Motor Exam] : the motor exam was normal [No Focal Deficits] : no focal deficits [Oriented To Time, Place, And Person] : oriented to person, place, and time [Impaired Insight] : insight and judgment were intact [Affect] : the affect was normal [Mood] : the mood was normal

## 2021-01-21 PROBLEM — R56.9 SEIZURE-LIKE ACTIVITY: Status: ACTIVE | Noted: 2020-12-09

## 2021-01-21 NOTE — HISTORY OF PRESENT ILLNESS
[FreeTextEntry1] : 20 year old female with PMH of recurrent UTI since age 8, BL nephrolithiasis, indeterminate 1.5cm posterior bladder lesion on CT, POTS, (PNES)? presenting today for an initial evaluation. \par \par 09/2020 reports to have acute onset episodes of shaking/ tremors/ staring episodes/ seizure like activity.  Evaluated by neurology with seizure w.o  unremarkable thus far. Now over the last month, reports to have numbness/tingling sensation to BL arms and BL legs associated with intermittent weakness to lower extremities and difficulty walking. Symptoms last for few days and then resolve. Has trouble swallowing and speaking during episodes. \par \par denies rashes, new medications. Reports hx of being sick late February. Unsure if she had COVID. \par Has had extensive w/o thus far: unremarkable. \par \par Has been evaluated by psychiatry- not currently on any medications\par \par denies fever, chills,  denies dry eye,eye pain, eye redness, vision changes, dry mouth, oral ulcers, nasal congestion, denies rashes, photosensitivity, hair loss, skin thickening, denies blood clots\par

## 2021-01-21 NOTE — ASSESSMENT
[FreeTextEntry1] : Shaking/ tremors/ staring episodes/ seizure like activity\par ROS and PE otherwise unremarkable for underlying classic CTD/ systemic autoimmune disease\par \par - labs as below for completion\par - c/w neurology f.u and recommendations\par \par Discussed treatment plan with the patient and her mother. The patient was given the opportunity to ask questions and all questions were answered to their satisfaction.

## 2021-01-23 NOTE — ASSESSMENT
[FreeTextEntry1] : CARMEN COWAN is a 20 year-old RH female with a history of recurrent UTI since age 8, and BL nephrolithiasis who presents for followup for abnormal movements, seizure-like activity and gait instability that are suggestive of conversion disorder. However, will refer to movement specialist to exclude any rare movement disorders that may explain these sx's. Complete workup with MRI and ENS1. \par \par \par - MRI brain w/wo, epilepsy protocol\par - ENS1\par - referral to Movement clinic\par - pending rhem apptm\par - f/u April\par \par \par Personally reviewed all images, labs and other studies. More than 50% of time spent on counseling and educating patient and mother on differential, workup, disease course, and treatment/management. All questions and concerns answered and addressed in detail to patient's and mother's complete satisfaction. Patient and mother verbalized understanding and agreed to plan.\par

## 2021-01-23 NOTE — CONSULT LETTER
[FreeTextEntry1] : I had the pleasure of seeing your patient, CARMEN COWAN, in my office today. Please see my note below. \par \par If you have any questions, please do not hesitate to contact me.  [FreeTextEntry3] : Lissett Brown MD\par [facility]\par [facility] [place]\par [address] [place] [address]\par Phone: [phone]; Fax: [phone]\par \par

## 2021-01-23 NOTE — HISTORY OF PRESENT ILLNESS
[FreeTextEntry1] : LAST OFFICE VISIT: 12/9/20\par \par PCP: Dr. French \par Mother: Gina Carrizales\par \par INTERIM HISTORY:\par Pt today is accompanied by mother. Completed elective EMU1/12-1/13/21. Habitual events of staring, myoclonic jerks, rapid eye blinking were record without ictal correlate on EEG. Pt now also developing intermittent, self-resolving gait instability, consistent with astasia-abasia. Pending Rheum eval for possible etiology to explain these symptoms. \par \par During EMU, also evaluated by cardiology for possible POTS, who thought sinus tachy was secondary to dehydration. Had cystoscopy 12/15, which did not find any bladder lesion seen on CT. Probably artifact.\par \par \par PRIOR HISTORY:\par 12/9/20: This is a 20 year-old RH female with a history of recurrent UTI since age 8, BL nephrolithiasis and indeterminate 1.5cm posterior bladder lesion on CT who is referred by pediatrician VITOR FRENCH MD for evaluation and management of seizure-like activity. Pt today is accompanied by mother.  \par \par October of this year, began to develop intermittent episodes of BL eye blinking while maintaining awareness and still able to speak, though struggles. In November, behavior changed to staring and unresponsiveness, but still able to hear and somewhat aware of her surroundings. Then last few days, developed unresponsiveness, facial grimacing, intermittent twitching/jerking of different parts of the body, back arching, hands stiffened and positioned in contracted states. These episodes may last up to 2 hours, with gradual return of movements and then speech. Mother recorded all 3 types of behaviors on video. Reviewed all 3 in office today. Has been admitted to [facility] for these episodes. Discharged without AED. \par \par When asked about stress in life, admits to emotional stress and exhaust of seemingly endless workup, treatment and doctor visits for recurrent UTI, pain from BL nephrolithiasis, and recent discovery of the bladder lesion. Pending cystoscopy next week. Parents  when she was 4yo, and she sometimes feel she needs to be strong for her mother and be the emotional support for her mother. \par \par SEIZURE RISK FACTORS:\par Patient was a product of normal pregnancy and delivery. No history of febrile seizure, TBI, CNS infection, or FH of seizures.\par \par PREVIOUS AED:\par none\par \par IMAGING: \par CTH: prominent ventricles \par \par NEUROPHYSIOLOGY:\par EMU 1/12-1/13/21 ([facility]): \par -i: Three habitual events consisting of a mixture of intermittent right shoulder twitching/shrugging, head jerking, rapid BL eye blinking and staring at times with choreiform dystonic and hemiblastic components in the RUE. Patient awake but nonresponsive during the event. Amnesic to the entire event. Each event lasted roughly 30-60m in duration. Baseline awake EEG without ictal rhythm before, during and after the events.\par -ii: normal EEG \par \par NEUROPSYCHOLOGY: \par none

## 2021-01-23 NOTE — PHYSICAL EXAM
[FreeTextEntry1] : GENERAL PHYSICAL EXAM:\par GEN: no distress, normal affect\par HEENT: NCAT, OP clear\par EYES: sclera white, conjunctiva clear, no nystagmus\par NECK: supple\par CV: RRR    		\par PULM: CTAB, no wheezing\par GI: soft ABD, +BS, NT, ND\par EXT: peripheral pulse intact, no cyanosis\par MSK: muscle tone and strength normal\par SKIN: warm, dry, no rash or lesion on exposed skin \par \par NEUROLOGICAL EXAM:\par Mental Status\par Orientation: alert and oriented to person, place, time, and situation \par Language: clear and fluent, intact comprehension and repetition, intact naming and reading\par \par Cranial Nerves\par II: full visual fields intact \par III, IV, VI: PERRL, EOMI\par V, VII: facial sensation and movement intact and symmetric \par VIII: hearing intact \par IX, X: uvula midline, soft palate elevates normally \par XI: BL shoulder shrug intact \par XII: tongue midline\par \par Motor\par Shoulder abd: 5 (R), 5 (L)\par EF/EE: 5 (R), 5 (L)\par WF/WE: 5 (R), 5 (L)\par hand : 5 (R), 5 (L)\par HF/HE: 5 (R), 5 (L)\par KF/KE: 5 (R), 5 (L)\par DF/PF: 5 (R), 5 (L)                \par Tone and bulk are normal in upper and lower limbs\par No pronator drift\par \par Sensation\par Intact to light touch and pinprick in all 4 EXTs\par \par Reflex\par 2+ in BL biceps, triceps, brachioradialis, patella, ankle                                    \par Plantar responses downward bilaterally\par \par Coordination\par Normal FTN bilaterally\par \par Gait\par C/w astasia-abasia \par

## 2021-02-09 ENCOUNTER — APPOINTMENT (OUTPATIENT)
Dept: ELECTROPHYSIOLOGY | Facility: CLINIC | Age: 21
End: 2021-02-09
Payer: MEDICAID

## 2021-02-09 ENCOUNTER — NON-APPOINTMENT (OUTPATIENT)
Age: 21
End: 2021-02-09

## 2021-02-09 VITALS
WEIGHT: 115 LBS | DIASTOLIC BLOOD PRESSURE: 68 MMHG | BODY MASS INDEX: 19.63 KG/M2 | HEIGHT: 64 IN | HEART RATE: 103 BPM | TEMPERATURE: 98.5 F | OXYGEN SATURATION: 99 % | SYSTOLIC BLOOD PRESSURE: 110 MMHG

## 2021-02-09 VITALS — DIASTOLIC BLOOD PRESSURE: 68 MMHG | SYSTOLIC BLOOD PRESSURE: 116 MMHG

## 2021-02-09 DIAGNOSIS — Z83.438 FAMILY HISTORY OF OTHER DISORDER OF LIPOPROTEIN METABOLISM AND OTHER LIPIDEMIA: ICD-10-CM

## 2021-02-09 DIAGNOSIS — Z82.49 FAMILY HISTORY OF ISCHEMIC HEART DISEASE AND OTHER DISEASES OF THE CIRCULATORY SYSTEM: ICD-10-CM

## 2021-02-09 PROCEDURE — 99213 OFFICE O/P EST LOW 20 MIN: CPT | Mod: 25

## 2021-02-09 PROCEDURE — 93000 ELECTROCARDIOGRAM COMPLETE: CPT | Mod: 59

## 2021-02-09 PROCEDURE — 99072 ADDL SUPL MATRL&STAF TM PHE: CPT

## 2021-02-09 RX ORDER — POTASSIUM CITRATE 10 MEQ/1
10 MEQ TABLET, EXTENDED RELEASE ORAL TWICE DAILY
Qty: 60 | Refills: 5 | Status: DISCONTINUED | COMMUNITY
Start: 2020-11-06 | End: 2021-02-09

## 2021-02-09 RX ORDER — NITROFURANTOIN (MONOHYDRATE/MACROCRYSTALS) 25; 75 MG/1; MG/1
100 CAPSULE ORAL
Qty: 10 | Refills: 0 | Status: COMPLETED | COMMUNITY
Start: 2020-10-28 | End: 2021-02-09

## 2021-02-09 NOTE — PHYSICAL EXAM
[Respiration, Rhythm And Depth] : normal respiratory rhythm and effort [Exaggerated Use Of Accessory Muscles For Inspiration] : no accessory muscle use [Skin Color & Pigmentation] : normal skin color and pigmentation [Skin Turgor] : normal skin turgor [] : no rash [Oriented To Time, Place, And Person] : oriented to person, place, and time [Impaired Insight] : insight and judgment were intact [FreeTextEntry1] : Thin. Comfortable.

## 2021-02-09 NOTE — REASON FOR VISIT
[Follow-Up - From Hospitalization] : follow-up of a recent hospitalization for [Discharge Date: ___] : Discharge Date: [unfilled] [FreeTextEntry1] : Cardiologist: Dr. Negrete

## 2021-02-09 NOTE — HISTORY OF PRESENT ILLNESS
[FreeTextEntry1] : Gloria Trent is a 20 year old female with history of recurrent UTI, bilateral nephrolithiasis, syncope and seizure-like activity with negative vEEG and sinus tachycardia who presents for follow up.\par \par To summarize her history, she was seen during admission in mid January 2021 for tachycardia noted with standing. She has a history of syncope and has had 1 event in her life in early 2020 which occurred while she was sitting in the car, she developed a headache, and then felt dizzy and this was followed by syncope. When she awoke she did not feel right.\par \par Over the later months of 2020 she complained of increasing fatigue and palpitations. 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 The fatigue worsened to the point that she could not even walk to the bathroom. She also had 2 episodes where she was unable to speak and became unresponsive. She underwent Neurologic evaluation including vEEG during which she had an event but EEG was normal.\par \par Of note, she had a TSH in 12/2020 with her PCP which was normal. She reported while hospitalized that her urine is generally dark although she drinks about 3 bottles of water every day. She has also had a poor appetite lately. She also had nephrolithiasis and recurrent UTIs which is improving. During her admission she was also orthostatic with vitals changing from laying /71;  to standing 88/60;  bpm. She was given IVF without significant improvement.\par \par She sees Dr. Negrete at [facility] and has had a work up with heart monitoring, echo, and stress test which were all reportedly normal.\par \par Since discharge, she has been evaluated by Rheumatology and is awaiting lab work to assess for any abnormalities.\par \par Today, the patient states that she feels about the same. She states that she will have sudden chest discomfort and palpitations. She reports that when she walks her HR jumps up quickly. She feels she is always "seeing stars." She continues to be quite dizzy when walking around. Her urine is lighter. She is drinking more water. She also takes salt tabs.

## 2021-02-09 NOTE — DISCUSSION/SUMMARY
[FreeTextEntry1] : Gloria Trent is a 20 year old female with history of recurrent UTI, bilateral nephrolithiasis, syncope and seizure-like activity with negative vEEG and sinus tachycardia who presents for follow up.\par \par She continues to have tachycardia along with palpitations. We discussed using an Apple Watch or [employer] glenda to record rhythm tracings during her episodes to assess for possible arrhythmia. She has also had a drop in appetite recently so we discussed possibly using Ensure. I also recommended she drink Gatorade as opposed to just water to help keep her volume expanded. She has had trouble ambulating recently and is awaiting Neurologic and Rheumatologic evaluation. I suggested she try using a recumbent bike as exercise can help manage tachycardia.\par \par If the above measures are ineffective, then we could consider using Fludrocortisone or midodrine. However I feel that it is important to rule out any underlying disease process that could be contributing to her tachycardia.\par \par I also recommended that she get an evaluation at the [facility] given their experience. \par \par Recommendations:\par - Ensure\par - Gatorade\par - Evaluation at [facility]\par - If ineffective, consider Fludrocortisone or Midorine\par \par RTC in 6 weeks.\par \par Omid Tirado MD, FACC, RS\par Clinical Cardiac Electrophysiology

## 2021-02-19 ENCOUNTER — NON-APPOINTMENT (OUTPATIENT)
Age: 21
End: 2021-02-19

## 2021-02-19 LAB
A PHAGOCYTOPH IGG TITR SER IF: NORMAL TITER
ALBUMIN MFR SERPL ELPH: 56 %
ALBUMIN SERPL ELPH-MCNC: 4.8 G/DL
ALBUMIN SERPL-MCNC: 4.3 G/DL
ALBUMIN/GLOB SERPL: 1.3 RATIO
ALDOLASE SERPL-CCNC: 4.3 U/L
ALP BLD-CCNC: 100 U/L
ALPHA1 GLOB MFR SERPL ELPH: 2.9 %
ALPHA1 GLOB SERPL ELPH-MCNC: 0.2 G/DL
ALPHA2 GLOB MFR SERPL ELPH: 10.5 %
ALPHA2 GLOB SERPL ELPH-MCNC: 0.8 G/DL
ALT SERPL-CCNC: 35 U/L
ANION GAP SERPL CALC-SCNC: 13 MMOL/L
AQP4 H2O CHANNEL AB SERPL IA-ACNC: <1.5 U/ML
AST SERPL-CCNC: 32 U/L
B BURGDOR AB SER QL IA: NEGATIVE
B MICROTI IGG TITR SER: NORMAL TITER
B-GLOBULIN MFR SERPL ELPH: 12 %
B-GLOBULIN SERPL ELPH-MCNC: 0.9 G/DL
BASOPHILS # BLD AUTO: 0.05 K/UL
BASOPHILS NFR BLD AUTO: 0.7 %
BILIRUB SERPL-MCNC: 0.4 MG/DL
BUN SERPL-MCNC: 6 MG/DL
C3 SERPL-MCNC: 119 MG/DL
C4 SERPL-MCNC: 16 MG/DL
CALCIUM SERPL-MCNC: 9.8 MG/DL
CCP AB SER IA-ACNC: 40 UNITS
CENTROMERE IGG SER-ACNC: <0.2 CD:130001892
CHLORIDE SERPL-SCNC: 104 MMOL/L
CHROMATIN AB SERPL-ACNC: <0.2 AL
CK SERPL-CCNC: 55 U/L
CO2 SERPL-SCNC: 22 MMOL/L
CREAT SERPL-MCNC: 0.8 MG/DL
CREAT SPEC-SCNC: 150 MG/DL
CREAT/PROT UR: 0.1 RATIO
CRP SERPL-MCNC: <0.1 MG/DL
DSDNA AB SER-ACNC: <12 IU/ML
E CHAFFEENSIS IGG TITR SER IF: NORMAL TITER
EJ AB SER QL: NEGATIVE
ENA JO1 AB SER IA-ACNC: <0.2 AL
ENA JO1 AB SER IA-ACNC: <20 UNITS
ENA PM/SCL AB SER-ACNC: <20 UNITS
ENA RNP AB SER IA-ACNC: <0.2 AL
ENA RNP AB SER IA-ACNC: <0.2 AL
ENA SM AB SER IA-ACNC: <0.2 AL
ENA SM+RNP AB SER IA-ACNC: <20 UNITS
ENA SS-A AB SER IA-ACNC: <0.2 AL
ENA SS-A IGG SER QL: <20 UNITS
ENA SS-B AB SER IA-ACNC: <0.2 AL
EOSINOPHIL # BLD AUTO: 0.17 K/UL
EOSINOPHIL NFR BLD AUTO: 2.5 %
FIBRILLARIN AB SER QL: NEGATIVE
GAMMA GLOB FLD ELPH-MCNC: 1.4 G/DL
GAMMA GLOB MFR SERPL ELPH: 18.6 %
GLUCOSE SERPL-MCNC: 82 MG/DL
HCT VFR BLD CALC: 40.7 %
HGB BLD-MCNC: 13 G/DL
IGA 24H UR QL IFE: NORMAL
IMM GRANULOCYTES NFR BLD AUTO: 0.1 %
INTERPRETATION SERPL IEP-IMP: NORMAL
KU AB SER QL: NEGATIVE
LYMPHOCYTES # BLD AUTO: 2.62 K/UL
LYMPHOCYTES NFR BLD AUTO: 38.2 %
M PROTEIN SPEC IFE-MCNC: NORMAL
MAN DIFF?: NORMAL
MCHC RBC-ENTMCNC: 26.9 PG
MCHC RBC-ENTMCNC: 31.9 GM/DL
MCV RBC AUTO: 84.3 FL
MDA-5 (P140)(CADM-140): <20 UNITS
MI2 AB SER QL: NEGATIVE
MONOCYTES # BLD AUTO: 0.62 K/UL
MONOCYTES NFR BLD AUTO: 9 %
MPO AB + PR3 PNL SER: NORMAL
NEUTROPHILS # BLD AUTO: 3.39 K/UL
NEUTROPHILS NFR BLD AUTO: 49.5 %
NXP-2 (P140): 24 UNITS
OJ AB SER QL: NEGATIVE
PL12 AB SER QL: NEGATIVE
PL7 AB SER QL: NEGATIVE
PLATELET # BLD AUTO: 366 K/UL
POTASSIUM SERPL-SCNC: 4.7 MMOL/L
PROT SERPL-MCNC: 7.6 G/DL
PROT UR-MCNC: 12 MG/DL
RBC # BLD: 4.83 M/UL
RBC # FLD: 13 %
RF+CCP IGG SER-IMP: ABNORMAL
SODIUM SERPL-SCNC: 139 MMOL/L
SRP AB SERPL QL: NEGATIVE
THYROGLOB AB SERPL-ACNC: <20 IU/ML
THYROPEROXIDASE AB SERPL IA-ACNC: 12.4 IU/ML
TIF GAMMA (P155/140): <20 UNITS
U2 SNRNP AB SER QL: NEGATIVE
WBC # FLD AUTO: 6.86 K/UL

## 2021-02-23 ENCOUNTER — APPOINTMENT (OUTPATIENT)
Dept: NEUROLOGY | Facility: CLINIC | Age: 21
End: 2021-02-23

## 2021-03-23 ENCOUNTER — NON-APPOINTMENT (OUTPATIENT)
Age: 21
End: 2021-03-23

## 2021-03-23 ENCOUNTER — APPOINTMENT (OUTPATIENT)
Dept: ELECTROPHYSIOLOGY | Facility: CLINIC | Age: 21
End: 2021-03-23
Payer: MEDICAID

## 2021-03-23 VITALS
SYSTOLIC BLOOD PRESSURE: 106 MMHG | HEART RATE: 115 BPM | BODY MASS INDEX: 19.29 KG/M2 | OXYGEN SATURATION: 98 % | DIASTOLIC BLOOD PRESSURE: 74 MMHG | HEIGHT: 64 IN | WEIGHT: 113 LBS | TEMPERATURE: 97.1 F

## 2021-03-23 PROCEDURE — 93000 ELECTROCARDIOGRAM COMPLETE: CPT | Mod: 59

## 2021-03-23 PROCEDURE — 99214 OFFICE O/P EST MOD 30 MIN: CPT | Mod: Q5

## 2021-03-23 PROCEDURE — 99072 ADDL SUPL MATRL&STAF TM PHE: CPT

## 2021-03-23 NOTE — DISCUSSION/SUMMARY
[FreeTextEntry1] : Gloria Trent is a 20 year old female with history of recurrent UTI, bilateral nephrolithiasis, syncope and seizure-like activity with negative vEEG and sinus tachycardia who presents for follow up.\par \par Her symptoms are suggestive of autonomic dysfunction with peripheral venous insufficiency. She seems to have improved with Metoprolol so we will start Metoprolol Succinate 100mg daily. I asked her to monitor her blood pressure as well and if she has hypotensive, then we could consider starting Fludrocortisone or Midodrine. She has ordered compression stockings which she is now wearing. She felt worse off of Sodium Tabs so we will refill those as well. She will continue with increased hydration and salt intake.\par \par Recommendations:\par - Refill Sodium Chloride 1gm TID\par - Start Metoprolol Succinate 100mg daily\par - Continue with hydration, salt intake, gatorade\par \par Follow up in 1 month.\par \par Omid Tirado MD, FACC, FHRS\par Clinical Cardiac Electrophysiology

## 2021-03-23 NOTE — HISTORY OF PRESENT ILLNESS
[FreeTextEntry1] : Gloria Trent is a 20 year old female with history of recurrent UTI, bilateral nephrolithiasis, syncope and seizure-like activity with negative vEEG and sinus tachycardia who presents for follow up.\par \par To summarize her history, she was seen during admission in mid January 2021 for tachycardia noted with standing. She has a history of syncope and has had 1 event in her life in early 2020 which occurred while she was sitting in the car, she developed a headache, and then felt dizzy and this was followed by syncope. When she awoke she did not feel right.\par \par Over the later months of 2020 she complained of increasing fatigue and palpitations. 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 The fatigue worsened to the point that she could not even walk to the bathroom. She also had 2 episodes where she was unable to speak and became unresponsive. She underwent Neurologic evaluation including vEEG during which she had an event but EEG was normal.\par \par Of note, she had a TSH in 12/2020 with her PCP which was normal. She reported while hospitalized that her urine is generally dark although she drinks about 3 bottles of water every day. She has also had a poor appetite. She had episodes of nephrolithiasis and recurrent UTIs, which was improving. During her admission she was also orthostatic with vitals changing from laying /71;  to standing 88/60;  bpm. She was given IVF without significant improvement.\par \par She sees Dr. Negrete at [facility] and has had a work up with heart monitoring, echo, and stress test which were all reportedly normal. Neurologic work up has also been unrevealing. At her last visit she was encouraged to increase hydration and exercise with recumbent bicycle.\par \par She was referred for cardiac CTA by Dr. Negrete. She was given Metoprolol x2 and she said that she felt a little bit better. She continues to have fatigue or palpitations. She reports that she is worse when the weather is extreme or with significant changes. She has been eating salt a bit more and drinking lot of Gatorade and water. She reports that she had a few days without salt tabs after which her symptoms were worse. She feels her appetite is somewhat improved. She notes that if she stands for too long she notes that her legs get discolored and purple and the changes will ascend up her leg.

## 2021-03-23 NOTE — PHYSICAL EXAM
[General Appearance - Well Developed] : well developed [General Appearance - Well Nourished] : well nourished [Respiration, Rhythm And Depth] : normal respiratory rhythm and effort [Exaggerated Use Of Accessory Muscles For Inspiration] : no accessory muscle use [Abnormal Walk] : normal gait [Nail Clubbing] : no clubbing of the fingernails [Cyanosis, Localized] : no localized cyanosis [Skin Color & Pigmentation] : normal skin color and pigmentation [Skin Turgor] : normal skin turgor [] : no rash [Oriented To Time, Place, And Person] : oriented to person, place, and time [Impaired Insight] : insight and judgment were intact [No Anxiety] : not feeling anxious

## 2021-04-14 ENCOUNTER — OUTPATIENT (OUTPATIENT)
Dept: OUTPATIENT SERVICES | Facility: HOSPITAL | Age: 21
LOS: 1 days | End: 2021-04-14

## 2021-04-14 ENCOUNTER — RESULT REVIEW (OUTPATIENT)
Age: 21
End: 2021-04-14

## 2021-04-14 ENCOUNTER — APPOINTMENT (OUTPATIENT)
Dept: PEDIATRIC NEPHROLOGY | Facility: CLINIC | Age: 21
End: 2021-04-14

## 2021-04-14 ENCOUNTER — APPOINTMENT (OUTPATIENT)
Dept: ULTRASOUND IMAGING | Facility: CLINIC | Age: 21
End: 2021-04-14
Payer: MEDICAID

## 2021-04-14 DIAGNOSIS — N20.9 URINARY CALCULUS, UNSPECIFIED: ICD-10-CM

## 2021-04-14 PROCEDURE — 76775 US EXAM ABDO BACK WALL LIM: CPT | Mod: 26

## 2021-04-19 ENCOUNTER — APPOINTMENT (OUTPATIENT)
Dept: NEUROLOGY | Facility: CLINIC | Age: 21
End: 2021-04-19
Payer: MEDICAID

## 2021-04-19 VITALS
BODY MASS INDEX: 19.63 KG/M2 | OXYGEN SATURATION: 98 % | TEMPERATURE: 98 F | HEART RATE: 92 BPM | DIASTOLIC BLOOD PRESSURE: 71 MMHG | SYSTOLIC BLOOD PRESSURE: 102 MMHG | WEIGHT: 115 LBS | HEIGHT: 64 IN

## 2021-04-19 PROCEDURE — 99214 OFFICE O/P EST MOD 30 MIN: CPT

## 2021-04-19 PROCEDURE — 99072 ADDL SUPL MATRL&STAF TM PHE: CPT

## 2021-04-20 NOTE — ASSESSMENT
[FreeTextEntry1] : CARMEN COWAN is a 20 year-old RH female with a history of recurrent UTI since age 8, and BL nephrolithiasis who presents for followup for abnormal movements, seizure-like activity and gait instability that are suggestive of conversion disorder. MRI and ENS negative. All symptoms resolved, except for staring spells. Advised pt and mother to establish care with therapist for CBT. F/u prn.\par \par \par Personally reviewed all images, labs and other studies. More than 50% of time spent on counseling and educating patient and mother on differential, workup, disease course, and treatment/management. All questions and concerns answered and addressed in detail to patient's and mother's complete satisfaction. Patient and mother verbalized understanding and agreed to plan.\par

## 2021-04-20 NOTE — CONSULT LETTER
[Dear  ___] : Dear  [unfilled], [Sincerely,] : Sincerely, [FreeTextEntry1] : I had the pleasure of seeing your patient, CARMEN COWAN, in my office today. Please see my note below. \par \par If you have any questions, please do not hesitate to contact me.  [FreeTextEntry3] : Lissett Brown MD\par [facility]\par [facility] [place]\par [address] [place] [address]\par Phone: [phone]; Fax: [phone]\par \par

## 2021-04-20 NOTE — REASON FOR VISIT
[Follow-Up: _____] : a [unfilled] follow-up visit [Parent] : parent [FreeTextEntry1] : seizure-like activity

## 2021-04-27 ENCOUNTER — APPOINTMENT (OUTPATIENT)
Dept: ELECTROPHYSIOLOGY | Facility: CLINIC | Age: 21
End: 2021-04-27
Payer: MEDICAID

## 2021-04-27 ENCOUNTER — NON-APPOINTMENT (OUTPATIENT)
Age: 21
End: 2021-04-27

## 2021-04-27 VITALS
TEMPERATURE: 97.8 F | DIASTOLIC BLOOD PRESSURE: 81 MMHG | OXYGEN SATURATION: 97 % | RESPIRATION RATE: 16 BRPM | HEIGHT: 64 IN | WEIGHT: 115 LBS | SYSTOLIC BLOOD PRESSURE: 115 MMHG | HEART RATE: 84 BPM | BODY MASS INDEX: 19.63 KG/M2

## 2021-04-27 VITALS
SYSTOLIC BLOOD PRESSURE: 90 MMHG | HEART RATE: 94 BPM | RESPIRATION RATE: 16 BRPM | OXYGEN SATURATION: 96 % | DIASTOLIC BLOOD PRESSURE: 68 MMHG

## 2021-04-27 VITALS — DIASTOLIC BLOOD PRESSURE: 68 MMHG | SYSTOLIC BLOOD PRESSURE: 90 MMHG

## 2021-04-27 VITALS
SYSTOLIC BLOOD PRESSURE: 104 MMHG | DIASTOLIC BLOOD PRESSURE: 70 MMHG | HEART RATE: 120 BPM | RESPIRATION RATE: 18 BRPM | OXYGEN SATURATION: 96 %

## 2021-04-27 PROCEDURE — 99213 OFFICE O/P EST LOW 20 MIN: CPT

## 2021-04-27 PROCEDURE — 93000 ELECTROCARDIOGRAM COMPLETE: CPT

## 2021-04-27 PROCEDURE — 99072 ADDL SUPL MATRL&STAF TM PHE: CPT

## 2021-04-27 NOTE — PHYSICAL EXAM
[Well Developed] : well developed [Well Nourished] : well nourished [Normal S1, S2] : normal S1, S2 [No Murmur] : no murmur [Clear Lung Fields] : clear lung fields [Normal Gait] : normal gait [No Edema] : no edema [Moves all extremities] : moves all extremities [Alert and Oriented] : alert and oriented

## 2021-04-29 NOTE — DISCUSSION/SUMMARY
[FreeTextEntry1] : Gloria Trent is a 20 year old female with history of recurrent UTI, bilateral nephrolithiasis, syncope and seizure-like activity with negative vEEG and sinus tachycardia who presents for follow up.\par \par To summarize her history, she was seen during admission in mid January 2021 for tachycardia noted with standing. She has a history of syncope and has had 1 event in her life in early 2020 which occurred while she was sitting in the car, she developed a headache, and then felt dizzy and this was followed by syncope. When she awoke she did not feel right.\par \par Over the later months of 2020 she complained of increasing fatigue and palpitations. 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 The fatigue worsened to the point that she could not even walk to the bathroom. She also had 2 episodes where she was unable to speak and became unresponsive. She underwent Neurologic evaluation including vEEG during which she had an event but EEG was normal.\par \par Of note, she had a TSH in 12/2020 with her PCP which was normal. She reported while hospitalized that her urine is generally dark although she drinks about 3 bottles of water every day. She has also had a poor appetite. She had episodes of nephrolithiasis and recurrent UTIs, which was improving. During her admission she was also orthostatic with vitals changing from laying /71;  to standing 88/60;  bpm. She was given IVF without significant improvement.\par \par She sees Dr. Negrete at [facility] and has had a work up with heart monitoring, echo, and stress test which were all reportedly normal. Neurologic work up has also been unrevealing. At her last visit she was encouraged to increase hydration and exercise with recumbent bicycle.\par \par She was referred for cardiac CTA by Dr. Negrete. She was given Metoprolol x2 and she said that she felt a little bit better.symptoms were worse. She feels her appetite is somewhat improved. She notes that if she stands for too long she notes that her legs get discolored and purple and the changes will ascend up her leg. \par \par During her last follow up with Dr. Tirado her symptoms were felt to be consistent with autonomic dysfunction with peripheral venous insufficiency.  Toprol 100mg daily was initiated, and continued salt tabs, and compression stockings recommended.  \par \par Since last  follow up she reports she has had gradual improvement. Tolerating walking 5-10 minutes daily. Still has symptoms of dizziness with standing, heat, or if she over exerts herself. Denies syncope. Saw neurology 4/19 and neurologic symptoms suggestive of conversion disorder. In office today BP stable from supine to standing, HR from 84 supine, to 120 bpm standing. Prior to metoprolol usage she reports her apple watch would report hr 170-180 bpm when standing. \par \par Recommendation: \par \par -Continue toprol 100mg daily\par -Continue with hydration, salt intake, and sodium chloride 1 gm TID\par - SBP 90s upon standing, will add Florinef 0.1mg daily\par -Gradual increase in activity recommended- to try indoor stationary bike \par \par EP follow up in 3 months or sooner PRN\par \par Tonia Soliz ANP-C

## 2021-04-29 NOTE — END OF VISIT
[FreeTextEntry3] : I have personally seen, examined, and participated in the care of this patient. I have reviewed all pertinent clinical information, including history, physical exam, plan, and the PA/NP's note and agree except as noted below.\par \par Symptoms improved with Metoprolol however now also has orthostasis. Will add Fludrocortisone 0.1mg daily to help with symptoms. Recommended she continue to increase daily activity as tolerated. Will follow up in 3 months.\par \par Omid Tirado MD, FACC, RS\par Clinical Cardiac Electrophysiology

## 2021-04-29 NOTE — REVIEW OF SYSTEMS
[Headache] : headache [Feeling Fatigued] : feeling fatigued [Chest Discomfort] : chest discomfort [Palpitations] : palpitations [Dizziness] : dizziness [SOB] : no shortness of breath [Dyspnea on exertion] : not dyspnea during exertion [Syncope] : no syncope

## 2021-04-29 NOTE — HISTORY OF PRESENT ILLNESS
[FreeTextEntry1] : Gloria Trent is a 20 year old female with history of recurrent UTI, bilateral nephrolithiasis, syncope and seizure-like activity with negative vEEG and sinus tachycardia who presents for follow up.\par \par To summarize her history, she was seen during admission in mid January 2021 for tachycardia noted with standing. She has a history of syncope and has had 1 event in her life in early 2020 which occurred while she was sitting in the car, she developed a headache, and then felt dizzy and this was followed by syncope. When she awoke she did not feel right.\par \par Over the later months of 2020 she complained of increasing fatigue and palpitations. 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 The fatigue worsened to the point that she could not even walk to the bathroom. She also had 2 episodes where she was unable to speak and became unresponsive. She underwent Neurologic evaluation including vEEG during which she had an event but EEG was normal.\par \par Of note, she had a TSH in 12/2020 with her PCP which was normal. She reported while hospitalized that her urine is generally dark although she drinks about 3 bottles of water every day. She has also had a poor appetite. She had episodes of nephrolithiasis and recurrent UTIs, which was improving. During her admission she was also orthostatic with vitals changing from laying /71;  to standing 88/60;  bpm. She was given IVF without significant improvement.\par \par She sees Dr. Negrete at [facility] and has had a work up with heart monitoring, echo, and stress test which were all reportedly normal. Neurologic work up has also been unrevealing. At her last visit she was encouraged to increase hydration and exercise with recumbent bicycle.\par \par She was referred for cardiac CTA by Dr. Negrete. She was given Metoprolol x2 and she said that she felt a little bit better.symptoms were worse. She feels her appetite is somewhat improved. She notes that if she stands for too long she notes that her legs get discolored and purple and the changes will ascend up her leg. \par \par During her last follow up with Dr. Tirado her symptoms were felt to be consistent with autonomic dysfunction with peripheral venous insufficiency.  Toprol 100mg daily was initiated, and continued salt tabs, and compression stockings recommended.  \par \par Since last  follow up she reports she has had gradual improvement. Tolerating walking 5-10 minutes daily. Still has symptoms of dizziness with standing, heat, or if she over exerts herself. Denies syncope. Saw neurology 4/19 and neurologic symptoms suggestive of conversion disorder. In office today BP stable from suppine to standing, HR from 84 suppine, to 120 bpm standing. Prior to metoprolol usage she reports her apple watch would report hr 170-180 bpm when standing.

## 2021-05-07 ENCOUNTER — NON-APPOINTMENT (OUTPATIENT)
Age: 21
End: 2021-05-07

## 2021-07-20 ENCOUNTER — APPOINTMENT (OUTPATIENT)
Dept: ELECTROPHYSIOLOGY | Facility: CLINIC | Age: 21
End: 2021-07-20
Payer: MEDICAID

## 2021-07-20 VITALS
TEMPERATURE: 97.8 F | BODY MASS INDEX: 20.96 KG/M2 | DIASTOLIC BLOOD PRESSURE: 72 MMHG | HEIGHT: 61 IN | SYSTOLIC BLOOD PRESSURE: 102 MMHG | HEART RATE: 88 BPM | WEIGHT: 111 LBS | OXYGEN SATURATION: 100 %

## 2021-07-20 VITALS — SYSTOLIC BLOOD PRESSURE: 108 MMHG | HEART RATE: 88 BPM | DIASTOLIC BLOOD PRESSURE: 70 MMHG

## 2021-07-20 DIAGNOSIS — R53.83 OTHER FATIGUE: ICD-10-CM

## 2021-07-20 DIAGNOSIS — R55 SYNCOPE AND COLLAPSE: ICD-10-CM

## 2021-07-20 PROCEDURE — 99214 OFFICE O/P EST MOD 30 MIN: CPT

## 2021-07-20 PROCEDURE — 93000 ELECTROCARDIOGRAM COMPLETE: CPT

## 2021-07-20 RX ORDER — FLUDROCORTISONE ACETATE 0.1 MG/1
0.1 TABLET ORAL
Qty: 90 | Refills: 1 | Status: DISCONTINUED | COMMUNITY
Start: 2021-04-27 | End: 2021-07-20

## 2021-07-22 NOTE — DISCUSSION/SUMMARY
[FreeTextEntry1] : Gloria Trent is a 20 year old female with history of recurrent UTI, bilateral nephrolithiasis, syncope and seizure-like activity with negative vEEG and sinus tachycardia who presents for follow up.\par \par To summarize her history, she was seen during admission in mid January 2021 for tachycardia noted with standing. She has a history of syncope and has had 1 event in her life in early 2020 which occurred while she was sitting in the car, she developed a headache, and then felt dizzy and this was followed by syncope. When she awoke she did not feel right.\par \par Over the later months of 2020 she complained of increasing fatigue and palpitations. 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 The fatigue worsened to the point that she could not even walk to the bathroom. She also had 2 episodes where she was unable to speak and became unresponsive. She underwent Neurologic evaluation including vEEG during which she had an event but EEG was normal.\par \par Of note, she had a TSH in 12/2020 with her PCP which was normal. She reported while hospitalized that her urine is generally dark although she drinks about 3 bottles of water every day. She has also had a poor appetite. She had episodes of nephrolithiasis and recurrent UTIs, which was improving. During her admission she was also orthostatic with vitals changing from laying /71;  to standing 88/60;  bpm. She was given IVF without significant improvement.\par \par She sees Dr. Negrete at [facility] and has had a work up with heart monitoring, echo, and stress test which were all reportedly normal. Neurologic work up has also been unrevealing. At her last visit she was encouraged to increase hydration and exercise with recumbent bicycle.\par \par She was referred for cardiac CTA by Dr. Negrete. She was given Metoprolol x2 and she said that she felt a little bit better.symptoms were worse. She feels her appetite is somewhat improved. She notes that if she stands for too long she notes that her legs get discolored and purple and the changes will ascend up her leg. \par \par During her last follow up with Dr. Tirado her symptoms were felt to be consistent with autonomic dysfunction with peripheral venous insufficiency.  Toprol 100mg daily was initiated, and continued salt tabs, and compression stockings recommended.  \par \par Since last follow up she reports notable improvement since starting Florinef. Has ongoing complaints dizziness and tunnel vision after prolonged standing or standing too quickly. Overall symptoms have improved and denies palpitations, syncope or presyncope. In office today, BP and HR remains stable from supine to standing.  Recently experiencing periodic bursts of agitation and some difficulty sleeping. Symptoms are mild but she believes they may have started after Floinef initiation. \par \par - Increase Florinef to 0.2mg daily.  Monitor closely for side effects closest. \par - Continue Toprol 100mg daily\par - Continue with hydration, salt intake, and sodium chloride 1 gm TID\par - Gradual increase in activity recommended\par \par Seen and discussed with Dr. Tirado\par Anny RHODES\par

## 2021-07-22 NOTE — END OF VISIT
[Time Spent: ___ minutes] : I have spent [unfilled] minutes of time on the encounter. [FreeTextEntry3] : I have personally seen, examined, and participated in the care of this patient. I have reviewed all pertinent clinical information, including history, physical exam, plan, and the PA/NP's note and agree except as noted below.\par \par After addition of Florinef at last visit she notes that her orthostasis has improved. She notes that since starting Florinef she has had more emotional instability. Her mother feels that she is about 75% back to normal. We discussed options of continuing Florinef at current dose, increasing Florinef dose, or possibly adding Midodrine. She would like to try higher dose of Florinef first. I feel that is the best decision as well as it is easier to manage than TID medication. 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 Of note, she has also returned to martial arts and is able to participate (though not to her full extent) which is a significant improvement than how she felt in the past.\par \par Omid Tirado MD, FACC, RS\par Clinical Cardiac Electrophysiology

## 2021-07-22 NOTE — HISTORY OF PRESENT ILLNESS
[FreeTextEntry1] : Gloria Trent is a 20 year old female with history of recurrent UTI, bilateral nephrolithiasis, syncope and seizure-like activity with negative vEEG and sinus tachycardia who presents for follow up.\par \par To summarize her history, she was seen during admission in mid January 2021 for tachycardia noted with standing. She has a history of syncope and has had 1 event in her life in early 2020 which occurred while she was sitting in the car, she developed a headache, and then felt dizzy and this was followed by syncope. When she awoke she did not feel right.\par \par Over the later months of 2020 she complained of increasing fatigue and palpitations. 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 The fatigue worsened to the point that she could not even walk to the bathroom. She also had 2 episodes where she was unable to speak and became unresponsive. She underwent Neurologic evaluation including vEEG during which she had an event but EEG was normal.\par \par Of note, she had a TSH in 12/2020 with her PCP which was normal. She reported while hospitalized that her urine is generally dark although she drinks about 3 bottles of water every day. She has also had a poor appetite. She had episodes of nephrolithiasis and recurrent UTIs, which was improving. During her admission she was also orthostatic with vitals changing from laying /71;  to standing 88/60;  bpm. She was given IVF without significant improvement.\par \par She sees Dr. Negrete at [facility] and has had a work up with heart monitoring, echo, and stress test which were all reportedly normal. Neurologic work up has also been unrevealing. At her last visit she was encouraged to increase hydration and exercise with recumbent bicycle.\par \par She was referred for cardiac CTA by Dr. Negrete. She was given Metoprolol x2 and she said that she felt a little bit better.symptoms were worse. She feels her appetite is somewhat improved. She notes that if she stands for too long she notes that her legs get discolored and purple and the changes will ascend up her leg. \par \par During her last follow up with Dr. Tirado her symptoms were felt to be consistent with autonomic dysfunction with peripheral venous insufficiency.  Toprol 100mg daily was initiated, and continued salt tabs, and compression stockings recommended. Symptoms had improved at last follow-up but started on Florinef 0.1mg for orthostasis. \par \par Since last follow up she reports notable improvement since starting Florinef. Has ongoing complaints dizziness and tunnel vision after prolonged standing or standing too quickly. Overall symptoms have improved and denies palpitations, syncope or presyncope. In office today, BP and HR remains stable from supine to standing.  \par

## 2021-09-03 ENCOUNTER — TRANSCRIPTION ENCOUNTER (OUTPATIENT)
Age: 21
End: 2021-09-03

## 2021-10-19 ENCOUNTER — APPOINTMENT (OUTPATIENT)
Dept: ELECTROPHYSIOLOGY | Facility: CLINIC | Age: 21
End: 2021-10-19

## 2021-10-19 NOTE — HISTORY OF PRESENT ILLNESS
[FreeTextEntry1] : Gloria Trent is a 21 year old female with history of recurrent UTI, bilateral nephrolithiasis, syncope and seizure-like activity with negative vEEG and sinus tachycardia who presents for follow up and management of autonomic dysfunction. \par \par To summarize her history, she was seen during admission in mid January 2021 for tachycardia noted with standing. She has a history of syncope and has had 1 event in her life in early 2020 which occurred while she was sitting in the car, she developed a headache, and then felt dizzy and this was followed by syncope. When she awoke she did not feel right.\par \par Over the later months of 2020 she complained of increasing fatigue and palpitations. 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 She also had 2 episodes where she was unable to speak and became unresponsive. She underwent Neurologic evaluation including vEEG during which she had an event but EEG was normal.\par \par She sees Dr. Negrete at [facility] and has had a work up with heart monitoring, echo, and stress test which were all reportedly normal. Neurologic work up has also been unrevealing. Of note, when she was referred for cardiac CTA she was given metoprolol x 2 resulting in symptomatic improvement. \par \par Symptoms were felt to be consistent with autonomic dysfunction with peripheral venous insufficiency. Toprol 100mg daily was initiated, and continued salt tabs, and compression stockings recommended. Florinef was later initiated for orthostasis resulting in symptomatic improvement. \par \par During last follow up florinef was increased to 0.2mg daily. \par \par INCOMPLETENOTE NOT YET SEEN

## 2021-11-13 ENCOUNTER — RX RENEWAL (OUTPATIENT)
Age: 21
End: 2021-11-13

## 2021-12-21 ENCOUNTER — NON-APPOINTMENT (OUTPATIENT)
Age: 21
End: 2021-12-21

## 2021-12-21 ENCOUNTER — APPOINTMENT (OUTPATIENT)
Dept: ELECTROPHYSIOLOGY | Facility: CLINIC | Age: 21
End: 2021-12-21
Payer: MEDICAID

## 2021-12-21 VITALS
OXYGEN SATURATION: 98 % | WEIGHT: 117 LBS | TEMPERATURE: 98.2 F | BODY MASS INDEX: 19.49 KG/M2 | RESPIRATION RATE: 18 BRPM | DIASTOLIC BLOOD PRESSURE: 68 MMHG | HEART RATE: 72 BPM | HEIGHT: 65 IN | SYSTOLIC BLOOD PRESSURE: 104 MMHG

## 2021-12-21 PROCEDURE — 93000 ELECTROCARDIOGRAM COMPLETE: CPT

## 2021-12-21 PROCEDURE — 99214 OFFICE O/P EST MOD 30 MIN: CPT

## 2021-12-21 RX ORDER — NORETHINDRONE ACETATE AND ETHINYL ESTRADIOL 1MG-20(21)
1-20 KIT ORAL
Qty: 84 | Refills: 0 | Status: ACTIVE | COMMUNITY
Start: 2021-12-15

## 2021-12-21 NOTE — HISTORY OF PRESENT ILLNESS
[FreeTextEntry1] : Gloria Trent is a 21 year old female with inappropriate sinus tachycardia and orthostasis who presents for follow up.\par \par To summarize her history, she developed symptoms of increasing fatigue and palpitations in late 2020. 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 Her fatigue was severe enough that she could not go to the bathroom. She also had symptoms of syncope and periods of unresponsiveness. Neurologic evaluation was negative for seizure disorder or other neurologic abnormalities.\par \par She had a complete cardiac work up with Dr. Negrete at [facility] including holter monitoring, echocardiogram, stress testing and cardiac CTA, which were all normal.\par \par Given symptoms of inappropriate sinus tachycardia and orthostasis, she was started on Metoprolol Succinate 100mg daily and Fludrocortisone 0.1mg daily. At her last visit, her Fludrocortisone was increased to 0.2mg daily.\par \par Today, she reports that on the higher dose of Fludrocortisone on 0.2mg daily she has felt much better with the dizziness/lightheadedness. She also feels that her HR is not as high as it used to be on the higher dose. She is keeping up with fluid intake. She still does say that if she exerts herself too much she still gets symptoms. Especially if bending over and standing up.

## 2021-12-21 NOTE — PHYSICAL EXAM
[Well Developed] : well developed [Well Nourished] : well nourished [No Acute Distress] : no acute distress [No Respiratory Distress] : no respiratory distress  [Normal Gait] : normal gait [No Rash] : no rash [Moves all extremities] : moves all extremities [No Focal Deficits] : no focal deficits [Alert and Oriented] : alert and oriented

## 2021-12-21 NOTE — DISCUSSION/SUMMARY
[FreeTextEntry1] : Gloria Trent is a 21 year old female with inappropriate sinus tachycardia and orthostasis who presents for follow up.\par \par She is doing well on the current medication regimen. We will keep her on her current regimen of Fludrocortisone 0.2mg daily and Metoprolol Succinate 100mg daily. 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 I recommended she consider evaluation by an allergist/immunologist for further work up and consideration of other therapies.\par \par Recommendations:\par - Continue Fludrocortisone 0.2mg daily\par - Continue Metoprolol Succinate 100mg daily\par - Continue with hydration and salt tabs\par \par RTC in 6 months.\par \par Omid Tirado MD, FACC, RS\par Clinical Cardiac Electrophysiology

## 2021-12-21 NOTE — CARDIOLOGY SUMMARY
[de-identified] : \par 12/21/2021: NSR.\par  [de-identified] : \par 11/3-12/3/2020 [facility]: NSR. Symptoms corresponding to NSR. No arrhythmias.\par  [de-identified] : \par 11/23/2020 ETT: 9 minutes duration. 9 METS. No ischemia. 98% of MPHR (peak HR of 196 bpm, baseline  bpm).\par  [de-identified] : \par 11/17/2020 TTE: LVEF: 60%. LA normal (LAd 2.9 cm, NEIL 16 mL/m2). Interatrial septal aneurysm without shunt. Prolapse of posterior mitral leaflet.\par

## 2021-12-21 NOTE — REASON FOR VISIT
[Arrhythmia/ECG Abnorrmalities] : arrhythmia/ECG abnormalities [FreeTextEntry3] : Dr. French ([facility])

## 2021-12-23 ENCOUNTER — RX RENEWAL (OUTPATIENT)
Age: 21
End: 2021-12-23

## 2022-01-26 ENCOUNTER — RX RENEWAL (OUTPATIENT)
Age: 22
End: 2022-01-26

## 2022-03-27 ENCOUNTER — RX RENEWAL (OUTPATIENT)
Age: 22
End: 2022-03-27

## 2022-06-28 ENCOUNTER — APPOINTMENT (OUTPATIENT)
Dept: ELECTROPHYSIOLOGY | Facility: CLINIC | Age: 22
End: 2022-06-28

## 2022-06-28 VITALS
TEMPERATURE: 98.2 F | HEIGHT: 65 IN | WEIGHT: 125 LBS | SYSTOLIC BLOOD PRESSURE: 118 MMHG | HEART RATE: 87 BPM | DIASTOLIC BLOOD PRESSURE: 80 MMHG | BODY MASS INDEX: 20.83 KG/M2 | RESPIRATION RATE: 14 BRPM | OXYGEN SATURATION: 100 %

## 2022-06-28 VITALS — DIASTOLIC BLOOD PRESSURE: 74 MMHG | OXYGEN SATURATION: 99 % | SYSTOLIC BLOOD PRESSURE: 106 MMHG | HEART RATE: 78 BPM

## 2022-06-28 VITALS — OXYGEN SATURATION: 98 % | DIASTOLIC BLOOD PRESSURE: 70 MMHG | SYSTOLIC BLOOD PRESSURE: 98 MMHG | HEART RATE: 87 BPM

## 2022-06-28 VITALS — HEART RATE: 81 BPM | OXYGEN SATURATION: 97 % | SYSTOLIC BLOOD PRESSURE: 98 MMHG | DIASTOLIC BLOOD PRESSURE: 68 MMHG

## 2022-06-28 PROCEDURE — 93000 ELECTROCARDIOGRAM COMPLETE: CPT

## 2022-06-28 PROCEDURE — 99214 OFFICE O/P EST MOD 30 MIN: CPT

## 2022-06-28 RX ORDER — DESIPRAMINE 10 MG/1
10 TABLET, FILM COATED ORAL
Qty: 30 | Refills: 11 | Status: DISCONTINUED | COMMUNITY
Start: 2020-12-15 | End: 2022-06-28

## 2022-06-28 RX ORDER — NORMAL SALT TABLETS 1 G/G
1 TABLET ORAL 3 TIMES DAILY
Qty: 270 | Refills: 3 | Status: DISCONTINUED | COMMUNITY
End: 2022-06-28

## 2022-06-28 NOTE — REASON FOR VISIT
[Arrhythmia/ECG Abnorrmalities] : arrhythmia/ECG abnormalities [FreeTextEntry3] : Dr. French ([facility])

## 2022-06-28 NOTE — DISCUSSION/SUMMARY
[EKG obtained to assist in diagnosis and management of assessed problem(s)] : EKG obtained to assist in diagnosis and management of assessed problem(s) [FreeTextEntry1] : 21 year old female with inappropriate sinus tachycardia and orthostasis who presents for follow up.\par \par Overall, reports symptomatic improvement on the higher dose of Fludrocortisone on 0.2mg daily. Remains on Metoprolol. Has a h/o Kidney stone and stopped Sodium tablets due to concern they were exacerbating stones. Feels well most days, but has periodic days where she is highly symptomatic with dizziness, lightheadedness, and rare presyncope upon standing.\par \par - Continue Fludrocortisone 0.2mg daily\par - Continue Metoprolol Succinate 100mg daily\par - Stressed the importance of adequate hydration. She has been trying hard to drink plenty of fluids and will continue to work on this.\par - d/w Dr. Tirado and will investigate the possibility of IV fluid bolus for symptom management. \par - Encouraged to f/up with  allergist/immunologist regarding mast cell syndrome\par \par RTC in 6 months.\par \par D/w Dr Tirado\par Anny Conn PAC\par

## 2022-06-28 NOTE — HISTORY OF PRESENT ILLNESS
[FreeTextEntry1] : Gloria Trent is a 21 year old female with inappropriate sinus tachycardia and orthostasis who presents for follow up.\par \par To summarize her history, she developed symptoms of increasing fatigue and palpitations in late 2020. 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 Her fatigue was severe enough that she could not go to the bathroom. She also had symptoms of syncope and periods of unresponsiveness. Neurologic evaluation was negative for seizure disorder or other neurologic abnormalities.\par \par She had a complete cardiac work up with Dr. Negrete at [facility] including holter monitoring, echocardiogram, stress testing and cardiac CTA, which were all normal.\par \par Given symptoms of inappropriate sinus tachycardia and orthostasis, she was started on Metoprolol Succinate 100mg daily and Fludrocortisone 0.1mg daily. At prior visit, her Fludrocortisone was increased to 0.2mg daily.\par \par Overall, she reports symptomatic improvement on the higher dose of Fludrocortisone on 0.2mg daily. Remains on Metoprolol. Has a h/o Kidney stone and stopped Sodium tablets due to concern they were worsening stones. \iris Feels well most days, but has periodic days where she is highly symptomatic with dizziness, lightheadedness, and rare presyncope upon standing.\par

## 2022-06-28 NOTE — CARDIOLOGY SUMMARY
[de-identified] : \par 12/21/2021: NSR.\par  [de-identified] : \par 11/3-12/3/2020 [facility]: NSR. Symptoms corresponding to NSR. No arrhythmias.\par  [de-identified] : \par 11/23/2020 ETT: 9 minutes duration. 9 METS. No ischemia. 98% of MPHR (peak HR of 196 bpm, baseline  bpm).\par  [de-identified] : \par 11/17/2020 TTE: LVEF: 60%. LA normal (LAd 2.9 cm, NEIL 16 mL/m2). Interatrial septal aneurysm without shunt. Prolapse of posterior mitral leaflet.\par

## 2022-09-25 ENCOUNTER — RX RENEWAL (OUTPATIENT)
Age: 22
End: 2022-09-25

## 2022-12-20 ENCOUNTER — APPOINTMENT (OUTPATIENT)
Dept: ELECTROPHYSIOLOGY | Facility: CLINIC | Age: 22
End: 2022-12-20

## 2022-12-20 VITALS
TEMPERATURE: 98.5 F | SYSTOLIC BLOOD PRESSURE: 122 MMHG | DIASTOLIC BLOOD PRESSURE: 76 MMHG | HEIGHT: 65 IN | WEIGHT: 123 LBS | BODY MASS INDEX: 20.49 KG/M2 | OXYGEN SATURATION: 97 % | HEART RATE: 75 BPM

## 2022-12-20 PROCEDURE — 99214 OFFICE O/P EST MOD 30 MIN: CPT | Mod: 25

## 2022-12-20 PROCEDURE — 93000 ELECTROCARDIOGRAM COMPLETE: CPT

## 2022-12-20 NOTE — DISCUSSION/SUMMARY
[EKG obtained to assist in diagnosis and management of assessed problem(s)] : EKG obtained to assist in diagnosis and management of assessed problem(s) [FreeTextEntry1] : Gloria Trent is a 22 year old female with inappropriate sinus tachycardia and orthostasis who presents for follow up.\par \par To summarize her history, she developed symptoms of increasing fatigue and palpitations in late 2020. 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 Her fatigue was severe enough that she could not go to the bathroom. She also had symptoms of syncope and periods of unresponsiveness. Neurologic evaluation was negative for seizure disorder or other neurologic abnormalities.\par \par She had a complete cardiac work up with Dr. Negrete at [facility] including holter monitoring, echocardiogram, stress testing and cardiac CTA, which were all normal.\par \par Given symptoms of inappropriate sinus tachycardia and orthostasis, she was started on Metoprolol Succinate 100mg daily and Fludrocortisone 0.1mg daily. At prior visit, her Fludrocortisone was increased to 0.2mg daily.\par \iris Has a h/o Kidney stone and stopped Sodium tablets due to concern they were worsening stones. \par \par Since last follow up she notes symptomatic improvement. On occasion still experiences chest tightness with exertion and heart racing that resolves with rest. Rarely feels near syncope with positional changes now once every 1-2 months. Previously much more frequent. ECG reveals SR 77 bpm. \par \par Recommendation: \par \par  Continue Fludrocortisone 0.2mg daily\par - Continue Metoprolol Succinate 100mg daily\par - Stressed the importance of adequate hydration, compression stocking use. \par -Will arrange exercise stress test. \par -If symptoms continue to improve may attempt to lower florinef next visit. \par -Ep folow up in 6 months or sooner PRN.

## 2022-12-20 NOTE — END OF VISIT
[Time Spent: ___ minutes] : I have spent [unfilled] minutes of time on the encounter. [FreeTextEntry3] : I have personally seen, examined, and participated in the care of this patient. I have reviewed all pertinent clinical information, including history, physical exam, plan, and the PA/NP's note and agree except as noted below.\par \iris Continues to have gradual improvement though still limited on how much she is able to exert herself. Will plan to follow up in 6 months at which time may consider reducing dose of Fludrocortisone or Metoprolol. She notes that her HR jumps when exercising so will have her repeat exercise treadmill test to evaluate for any underlying atrial arrhythmias.\par \par Omid Tirado MD, FACC, RS\par Clinical Cardiac Electrophysiology

## 2022-12-20 NOTE — HISTORY OF PRESENT ILLNESS
[FreeTextEntry1] : Gloria Trent is a 22 year old female with inappropriate sinus tachycardia and orthostasis who presents for follow up.\par \par To summarize her history, she developed symptoms of increasing fatigue and palpitations in late 2020. 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 Her fatigue was severe enough that she could not go to the bathroom. She also had symptoms of syncope and periods of unresponsiveness. Neurologic evaluation was negative for seizure disorder or other neurologic abnormalities.\par \par She had a complete cardiac work up with Dr. Negrete at [facility] including holter monitoring, echocardiogram, stress testing and cardiac CTA, which were all normal.\par \par Given symptoms of inappropriate sinus tachycardia and orthostasis, she was started on Metoprolol Succinate 100mg daily and Fludrocortisone 0.1mg daily. At prior visit, her Fludrocortisone was increased to 0.2mg daily.\par \iris Has a h/o Kidney stone and stopped Sodium tablets due to concern they were worsening stones. \par \par Since last follow up she notes symptomatic improvement. On occasion still experiences chest tightness with exertion and heart racing that resolves with rest. Rarely feels near syncope with positional changes now once every 1-2 months. Previously much more frequent. ECG reveals SR 77 bpm. \par \par

## 2023-01-18 ENCOUNTER — APPOINTMENT (OUTPATIENT)
Dept: CARDIOLOGY | Facility: CLINIC | Age: 23
End: 2023-01-18
Payer: MEDICAID

## 2023-01-18 PROCEDURE — 93015 CV STRESS TEST SUPVJ I&R: CPT

## 2023-01-23 ENCOUNTER — TRANSCRIPTION ENCOUNTER (OUTPATIENT)
Age: 23
End: 2023-01-23

## 2023-03-14 ENCOUNTER — NON-APPOINTMENT (OUTPATIENT)
Age: 23
End: 2023-03-14

## 2023-06-20 ENCOUNTER — APPOINTMENT (OUTPATIENT)
Dept: ELECTROPHYSIOLOGY | Facility: CLINIC | Age: 23
End: 2023-06-20

## 2023-07-14 ENCOUNTER — APPOINTMENT (OUTPATIENT)
Dept: ELECTROPHYSIOLOGY | Facility: CLINIC | Age: 23
End: 2023-07-14
Payer: MEDICAID

## 2023-07-14 VITALS
TEMPERATURE: 98.7 F | BODY MASS INDEX: 22.16 KG/M2 | DIASTOLIC BLOOD PRESSURE: 72 MMHG | HEART RATE: 72 BPM | OXYGEN SATURATION: 100 % | HEIGHT: 65 IN | WEIGHT: 133 LBS | SYSTOLIC BLOOD PRESSURE: 118 MMHG

## 2023-07-14 DIAGNOSIS — R00.0 TACHYCARDIA, UNSPECIFIED: ICD-10-CM

## 2023-07-14 PROCEDURE — 99214 OFFICE O/P EST MOD 30 MIN: CPT | Mod: 25

## 2023-07-14 PROCEDURE — 93000 ELECTROCARDIOGRAM COMPLETE: CPT

## 2023-07-14 NOTE — PHYSICAL EXAM
[Well Developed] : well developed [No Acute Distress] : no acute distress [No Respiratory Distress] : no respiratory distress  [No Rash] : no rash [Moves all extremities] : moves all extremities [Alert and Oriented] : alert and oriented

## 2023-07-14 NOTE — DISCUSSION/SUMMARY
[EKG obtained to assist in diagnosis and management of assessed problem(s)] : EKG obtained to assist in diagnosis and management of assessed problem(s) [FreeTextEntry1] : Gloria Trent is a 22 year old female with inappropriate sinus tachycardia and orthostasis who presents for follow up.\par \par To summarize her history, she developed symptoms of increasing fatigue and palpitations in late 2020. 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 Her fatigue was severe enough that she could not go to the bathroom. She also had symptoms of syncope and periods of unresponsiveness. Neurologic evaluation was negative for seizure disorder or other neurologic abnormalities.\par \par She had a complete cardiac work up with Dr. Negrete at [facility] including holter monitoring, echocardiogram, stress testing and cardiac CTA, which were all normal.\par \par Given symptoms of inappropriate sinus tachycardia and orthostasis, she was started on Metoprolol Succinate 100mg daily and Fludrocortisone 0.1mg daily. At prior visit, her Fludrocortisone was increased to 0.2mg daily.\par  \par Since last follow up she notes continued symptomatic improvement. Feels rare lightheadedness with intense exertion but otherwise asymptomatic. On days when she forgets to take her medication, she notices recurrent palpitations and lightheadedness, but otherwise doing very well.  She feels as if she is able to live her life without constantly checking her HR or being apprehensive that symptoms will develop. \par Underwent EST which showed SR with appropriate HR and BP response. \par \par - Continue Fludrocortisone 0.2mg daily\par - Continue Metoprolol Succinate 100mg daily\par - Continue adequate hydration, compression stocking use whenever possible\par - EP follow up in 6 months or sooner PRN. \par \par discussed with Dr. Tirado\par Anny Conn PAC\par

## 2023-07-14 NOTE — HISTORY OF PRESENT ILLNESS
[FreeTextEntry1] : Gloria Trent is a 22 year old female with inappropriate sinus tachycardia and orthostasis who presents for follow up.\par \par To summarize her history, she developed symptoms of increasing fatigue and palpitations in late 2020. 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 Her fatigue was severe enough that she could not go to the bathroom. She also had symptoms of syncope and periods of unresponsiveness. Neurologic evaluation was negative for seizure disorder or other neurologic abnormalities.\par \par She had a complete cardiac work up with Dr. Negrete at [facility] including holter monitoring, echocardiogram, stress testing and cardiac CTA, which were all normal.\par \par Given symptoms of inappropriate sinus tachycardia and orthostasis, she was started on Metoprolol Succinate 100mg daily and Fludrocortisone 0.1mg daily. At prior visit, her Fludrocortisone was increased to 0.2mg daily.\par natasha Has a h/o Kidney stone and stopped Sodium tablets due to concern they were worsening stones. \par \par Since last follow up she notes continued symptomatic improvement. Feels rare lightheadedness with intense exertion but otherwise asymptomatic. On days when she forgets to take her medication, she notices recurrent palpitations and lightheadedness, but otherwise doing very well.  She feels as if she is able to live her life without constantly checking her HR or being apprehensive that symptoms will develop.

## 2023-07-14 NOTE — REVIEW OF SYSTEMS
[Palpitations] : palpitations [Dizziness] : dizziness [Fever] : no fever [Chills] : no chills [Feeling Fatigued] : not feeling fatigued [SOB] : no shortness of breath [Dyspnea on exertion] : not dyspnea during exertion [Chest Discomfort] : no chest discomfort [Leg Claudication] : no intermittent leg claudication [Orthopnea] : no orthopnea [Syncope] : no syncope

## 2023-09-22 ENCOUNTER — RX RENEWAL (OUTPATIENT)
Age: 23
End: 2023-09-22

## 2023-10-09 NOTE — HISTORY OF PRESENT ILLNESS
[FreeTextEntry1] : LAST OFFICE VISIT: 1/19/21\par \par PCP: Dr. French \par Mother: Gina Carrizales\par \par INTERIM HISTORY:\par Pt today is accompanied by mother. MRI 2/16/21 was essentially unremarkable. ENS panel from [facility] negative. Seeing cardiology for sinus tachycardia and responding well to metoprolol. Missed appt with Movement specialist Dr. Cuenca because they went to the wrong office. However, pt no longer having myoclonic jerks. Continues to have occasional staring episodes, triggered by physical and emotional stress.\par  \par \par PRIOR HISTORY:\par 12/9/20: This is a 20 year-old RH female with a history of recurrent UTI since age 8, BL nephrolithiasis and indeterminate 1.5cm posterior bladder lesion on CT who is referred by pediatrician VITOR FRENCH MD for evaluation and management of seizure-like activity. Pt today is accompanied by mother.  \par \par October of this year, began to develop intermittent episodes of BL eye blinking while maintaining awareness and still able to speak, though struggles. In November, behavior changed to staring and unresponsiveness, but still able to hear and somewhat aware of her surroundings. Then last few days, developed unresponsiveness, facial grimacing, intermittent twitching/jerking of different parts of the body, back arching, hands stiffened and positioned in contracted states. These episodes may last up to 2 hours, with gradual return of movements and then speech. Mother recorded all 3 types of behaviors on video. Reviewed all 3 in office today. Has been admitted to [facility] for these episodes. Discharged without AED. \par \par When asked about stress in life, admits to emotional stress and exhaust of seemingly endless workup, treatment and doctor visits for recurrent UTI, pain from BL nephrolithiasis, and recent discovery of the bladder lesion. Pending cystoscopy next week. Parents  when she was 6yo, and she sometimes feel she needs to be strong for her mother and be the emotional support for her mother. \par \par === 1/19/21 ===\par Pt today is accompanied by mother. Completed elective EMU1/12-1/13/21. Habitual events of staring, myoclonic jerks, rapid eye blinking were record without ictal correlate on EEG. Pt now also developing intermittent, self-resolving gait instability, consistent with astasia-abasia. Pending Rheum eval for possible etiology to explain these symptoms. During EMU, also evaluated by cardiology for possible POTS, who thought sinus tachy was secondary to dehydration. Had cystoscopy 12/15, which did not find any bladder lesion seen on CT. Probably artifact.\par \par \par SEIZURE RISK FACTORS:\par Patient was a product of normal pregnancy and delivery. No history of febrile seizure, TBI, CNS infection, or FH of seizures.\par \par PREVIOUS AED:\par none\par \par IMAGING: \par MRI brain w/wo, epilepsy protocol 2/16/21 ([facility]): incomplete rotation of L HC, essentially unremarkable \par \par NEUROPHYSIOLOGY:\par EMU 1/12-1/13/21 ([facility]): \par -i: Three habitual events consisting of a mixture of intermittent right shoulder twitching/shrugging, head jerking, rapid BL eye blinking and staring at times with choreiform dystonic and hemiblastic components in the RUE. Patient awake but nonresponsive during the event. Amnesic to the entire event. Each event lasted roughly 30-60m in duration. Baseline awake EEG without ictal rhythm before, during and after the events.\par -ii: normal EEG \par \par NEUROPSYCHOLOGY: \par none\par \par OTHER STUDIES:\par ENS panel from [facility] 1/19/21: negative
show

## 2024-01-23 ENCOUNTER — APPOINTMENT (OUTPATIENT)
Dept: ELECTROPHYSIOLOGY | Facility: CLINIC | Age: 24
End: 2024-01-23

## 2024-01-23 NOTE — HISTORY OF PRESENT ILLNESS
[FreeTextEntry1] : Gloira Trent is a 23 year old female with inappropriate sinus tachycardia and orthostasis who presents for follow up.  To summarize her history, she developed symptoms of increasing fatigue and palpitations in late 2020. 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 Her fatigue was severe enough that she could not go to the bathroom. She also had symptoms of syncope and periods of unresponsiveness. Neurologic evaluation was negative for seizure disorder or other neurologic abnormalities.  She had a complete cardiac work up with Dr. Negrete at [facility] including holter monitoring, echocardiogram, stress testing and cardiac CTA, which were all normal.  Given symptoms of inappropriate sinus tachycardia and orthostasis, she was started on Metoprolol Succinate 100mg daily and Fludrocortisone 0.1mg daily. At prior visit, her Fludrocortisone was increased to 0.2mg daily.  Has a h/o Kidney stone and stopped Sodium tablets due to concern they were worsening stones.  During previous f/u she reported continued symptomatic improvement.  INCOMPLETE NOTE NOT YET SEEN

## 2024-01-31 ENCOUNTER — NON-APPOINTMENT (OUTPATIENT)
Age: 24
End: 2024-01-31

## 2024-02-15 ENCOUNTER — NON-APPOINTMENT (OUTPATIENT)
Age: 24
End: 2024-02-15

## 2024-02-15 ENCOUNTER — EMERGENCY (EMERGENCY)
Facility: HOSPITAL | Age: 24
LOS: 1 days | Discharge: HOME | End: 2024-02-15
Attending: STUDENT IN AN ORGANIZED HEALTH CARE EDUCATION/TRAINING PROGRAM
Payer: MEDICAID

## 2024-02-15 VITALS
DIASTOLIC BLOOD PRESSURE: 81 MMHG | RESPIRATION RATE: 18 BRPM | HEART RATE: 82 BPM | OXYGEN SATURATION: 100 % | SYSTOLIC BLOOD PRESSURE: 119 MMHG

## 2024-02-15 VITALS
OXYGEN SATURATION: 98 % | TEMPERATURE: 98 F | HEART RATE: 90 BPM | SYSTOLIC BLOOD PRESSURE: 129 MMHG | RESPIRATION RATE: 20 BRPM | WEIGHT: 130.07 LBS | HEIGHT: 65 IN | DIASTOLIC BLOOD PRESSURE: 94 MMHG

## 2024-02-15 DIAGNOSIS — G90.A POSTURAL ORTHOSTATIC TACHYCARDIA SYNDROME [POTS]: ICD-10-CM

## 2024-02-15 DIAGNOSIS — R42 DIZZINESS AND GIDDINESS: ICD-10-CM

## 2024-02-15 DIAGNOSIS — R07.9 CHEST PAIN, UNSPECIFIED: ICD-10-CM

## 2024-02-15 LAB
ALBUMIN SERPL ELPH-MCNC: 4.1 G/DL — SIGNIFICANT CHANGE UP (ref 3.3–5.2)
ALP SERPL-CCNC: 94 U/L — SIGNIFICANT CHANGE UP (ref 40–120)
ALT FLD-CCNC: 17 U/L — SIGNIFICANT CHANGE UP
ANION GAP SERPL CALC-SCNC: 12 MMOL/L — SIGNIFICANT CHANGE UP (ref 5–17)
AST SERPL-CCNC: 27 U/L — SIGNIFICANT CHANGE UP
BASOPHILS # BLD AUTO: 0.04 K/UL — SIGNIFICANT CHANGE UP (ref 0–0.2)
BASOPHILS NFR BLD AUTO: 0.6 % — SIGNIFICANT CHANGE UP (ref 0–2)
BILIRUB SERPL-MCNC: 0.5 MG/DL — SIGNIFICANT CHANGE UP (ref 0.4–2)
BUN SERPL-MCNC: 8.4 MG/DL — SIGNIFICANT CHANGE UP (ref 8–20)
CALCIUM SERPL-MCNC: 9.4 MG/DL — SIGNIFICANT CHANGE UP (ref 8.4–10.5)
CHLORIDE SERPL-SCNC: 104 MMOL/L — SIGNIFICANT CHANGE UP (ref 96–108)
CO2 SERPL-SCNC: 24 MMOL/L — SIGNIFICANT CHANGE UP (ref 22–29)
CREAT SERPL-MCNC: 0.63 MG/DL — SIGNIFICANT CHANGE UP (ref 0.5–1.3)
EGFR: 129 ML/MIN/1.73M2 — SIGNIFICANT CHANGE UP
EOSINOPHIL # BLD AUTO: 0.14 K/UL — SIGNIFICANT CHANGE UP (ref 0–0.5)
EOSINOPHIL NFR BLD AUTO: 2.1 % — SIGNIFICANT CHANGE UP (ref 0–6)
GLUCOSE SERPL-MCNC: 78 MG/DL — SIGNIFICANT CHANGE UP (ref 70–99)
HCG SERPL-ACNC: <4 MIU/ML — SIGNIFICANT CHANGE UP
HCT VFR BLD CALC: 39.8 % — SIGNIFICANT CHANGE UP (ref 34.5–45)
HGB BLD-MCNC: 13.3 G/DL — SIGNIFICANT CHANGE UP (ref 11.5–15.5)
IMM GRANULOCYTES NFR BLD AUTO: 0.1 % — SIGNIFICANT CHANGE UP (ref 0–0.9)
LYMPHOCYTES # BLD AUTO: 2.04 K/UL — SIGNIFICANT CHANGE UP (ref 1–3.3)
LYMPHOCYTES # BLD AUTO: 30.1 % — SIGNIFICANT CHANGE UP (ref 13–44)
MCHC RBC-ENTMCNC: 27.8 PG — SIGNIFICANT CHANGE UP (ref 27–34)
MCHC RBC-ENTMCNC: 33.4 GM/DL — SIGNIFICANT CHANGE UP (ref 32–36)
MCV RBC AUTO: 83.3 FL — SIGNIFICANT CHANGE UP (ref 80–100)
MONOCYTES # BLD AUTO: 0.49 K/UL — SIGNIFICANT CHANGE UP (ref 0–0.9)
MONOCYTES NFR BLD AUTO: 7.2 % — SIGNIFICANT CHANGE UP (ref 2–14)
NEUTROPHILS # BLD AUTO: 4.05 K/UL — SIGNIFICANT CHANGE UP (ref 1.8–7.4)
NEUTROPHILS NFR BLD AUTO: 59.9 % — SIGNIFICANT CHANGE UP (ref 43–77)
PLATELET # BLD AUTO: 342 K/UL — SIGNIFICANT CHANGE UP (ref 150–400)
POTASSIUM SERPL-MCNC: 4.2 MMOL/L — SIGNIFICANT CHANGE UP (ref 3.5–5.3)
POTASSIUM SERPL-SCNC: 4.2 MMOL/L — SIGNIFICANT CHANGE UP (ref 3.5–5.3)
PROT SERPL-MCNC: 7.1 G/DL — SIGNIFICANT CHANGE UP (ref 6.6–8.7)
RBC # BLD: 4.78 M/UL — SIGNIFICANT CHANGE UP (ref 3.8–5.2)
RBC # FLD: 12.7 % — SIGNIFICANT CHANGE UP (ref 10.3–14.5)
SODIUM SERPL-SCNC: 140 MMOL/L — SIGNIFICANT CHANGE UP (ref 135–145)
WBC # BLD: 6.77 K/UL — SIGNIFICANT CHANGE UP (ref 3.8–10.5)
WBC # FLD AUTO: 6.77 K/UL — SIGNIFICANT CHANGE UP (ref 3.8–10.5)

## 2024-02-15 PROCEDURE — 93005 ELECTROCARDIOGRAM TRACING: CPT

## 2024-02-15 PROCEDURE — 36415 COLL VENOUS BLD VENIPUNCTURE: CPT

## 2024-02-15 PROCEDURE — 99284 EMERGENCY DEPT VISIT MOD MDM: CPT

## 2024-02-15 PROCEDURE — 93010 ELECTROCARDIOGRAM REPORT: CPT

## 2024-02-15 PROCEDURE — 99284 EMERGENCY DEPT VISIT MOD MDM: CPT | Mod: 25

## 2024-02-15 PROCEDURE — 85025 COMPLETE CBC W/AUTO DIFF WBC: CPT

## 2024-02-15 PROCEDURE — 99285 EMERGENCY DEPT VISIT HI MDM: CPT

## 2024-02-15 PROCEDURE — 84702 CHORIONIC GONADOTROPIN TEST: CPT

## 2024-02-15 PROCEDURE — 80053 COMPREHEN METABOLIC PANEL: CPT

## 2024-02-15 RX ORDER — SODIUM CHLORIDE 9 MG/ML
1000 INJECTION INTRAMUSCULAR; INTRAVENOUS; SUBCUTANEOUS ONCE
Refills: 0 | Status: COMPLETED | OUTPATIENT
Start: 2024-02-15 | End: 2024-02-15

## 2024-02-15 RX ADMIN — SODIUM CHLORIDE 1000 MILLILITER(S): 9 INJECTION INTRAMUSCULAR; INTRAVENOUS; SUBCUTANEOUS at 16:21

## 2024-02-15 NOTE — CONSULT NOTE ADULT - PROBLEM SELECTOR RECOMMENDATION 3
- hx of POTS   - was tolerating metoprolol and fludrocortisone outpatient with no POTS episodes in about a year   - will f/u outpatient EP and cardiology.   - recommend hydration IVF based on labs  - orthostatics are negative.   - monitor on telemetry for 24 hours, monitor for ectopy, tachycardia.   - recommend neuro follow up outpatient.

## 2024-02-15 NOTE — ED PROVIDER NOTE - ATTENDING CONTRIBUTION TO CARE
Pt states that for the past week she has had intermittent dizziness that she describes as lightheadedness associated with fatigue. Pt states that when she stands up she will feel it and is now having chest pain with it. no n/v. no fever/chills. no other complaints. pt with hx of pots and follows with dr. camp and is on florinef and metoprolol for it which had previously controlled her symptoms.    physical - rrr. ctab. abd - soft, nt. no edema. no rash.    plan - labs and ekg reviewed.  no cause of symptoms identified. orthostatic neg.  Case d/w cardiology NP, Isabell Joe, with plan to d/c with f/up to dr. camp as an outpatinet for MCOT and given return instructions.

## 2024-02-15 NOTE — ED PROVIDER NOTE - OBJECTIVE STATEMENT
24 y/o F presents from Home for chest pain, dizziness, vision problems slowly worsening the past week. Patient has a hx of POTS, PNES. POTS well controlled on metoprolol and fludrocortisone for 1 y/r w/o any major POTS episodes.

## 2024-02-15 NOTE — CONSULT NOTE ADULT - PROBLEM SELECTOR RECOMMENDATION 2
- hx of POTS, PNES  - recommend neurology outpatient follow up   - orthostatics negative   - check TSH  - check pregnancy test   - check BMP, salt levels. Was on salt tabs but d/c'd due to hx of kidney stones

## 2024-02-15 NOTE — CONSULT NOTE ADULT - SUBJECTIVE AND OBJECTIVE BOX
[facility]                                              CARDIOLOGY AT [place] [address]                                             Telephone: [phone]. Fax:[phone]                                                       CARDIOLOGY CONSULTATION NOTE                                                                                             History obtained by: Patient and medical record  Community Cardiologist: Dr Negrete/Dr Tirado    obtained: Yes [  ] No [x]   Reason for Consultation: lightheadedness and chest pain.   Available out pt records reviewed: Yes [ x ] No [  ]    Chief complaint:    Patient is a 23y old  Female who presents with a chief complaint of chest pain     HPI:  24 y/o F with hx of POTS and PNES presented to the ED with worsening lightheadedness and chest pain (which feels more like chest achiness). Patient has hx of kidney stones, was on salt tabs but stopped taking it due to worsening kidney stones. Patient also reports intermittent palpitations. Patient was on metoprolol and fludrocortisone for about a year without any major POTS episodes. 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 Patient repots compliance with medications. Patient denies SOB, GUNDERSON, N/V/D, and recent sick contact.     CARDIAC TESTING   ECHO: 11/17/2020  EF 60%. Normal LV function     CCTA: normal     STRESS: exercise stress test which showed SR with appropriate HR and BP response     CATH: none     ELECTROPHYSIOLOGY: follows with Dr. Tirado.     MCOT: NSR, no arrhythmias. Symptoms reported did not have any corresponding holter abnormalities, unremarkable     PAST MEDICAL HISTORY  Frequent UTI    MVP (mitral valve prolapse)    Postural orthostatic tachycardia syndrome [POTS]        PAST SURGICAL HISTORY  No significant past surgical history        SOCIAL HISTORY:  Denies smoking/alcohol/drugs  CIGARETTES:     ALCOHOL:  DRUGS:    FAMILY HISTORY:  No pertinent family history in first degree relatives      Family History of Cardiovascular Disease:  Yes [  ] No [x  ]  Coronary Artery Disease in first degree relative: Yes [  ] No [ x ]  Sudden Cardiac Death in First degree relative: Yes [  ] No [ x ]    HOME MEDICATIONS:  desipramine 10 mg oral tablet: 1 tab(s) orally once a day (at bedtime) (11 Jan 2021 13:39)      CURRENT CARDIAC MEDICATIONS:      CURRENT OTHER MEDICATIONS:      ALLERGIES:   No Known Allergies      REVIEW OF SYMPTOMS:   CONSTITUTIONAL: No fever, no chills, no weight loss, no weight gain, no fatigue   ENMT:  No vertigo; No sinus or throat pain  NECK: No pain or stiffness  CARDIOVASCULAR: No chest pain, no dyspnea, no syncope/presyncope, no palpitations, + dizziness, no Orthopnea, no Paroxsymal nocturnal dyspnea  RESPIRATORY: no Shortness of breath, no cough, no wheezing  : No dysuria, no hematuria   GI: No dark color stool, no nausea, no diarrhea, no constipation, no abdominal pain   NEURO: No headache, no slurred speech   MUSCULOSKELETAL: No joint pain or swelling; No muscle, back, or extremity pain  PSYCH: No agitation, no anxiety.    ALL OTHER REVIEW OF SYSTEMS ARE NEGATIVE.    VITAL SIGNS:  T(C): 36.7 (02-15-24 @ 14:25), Max: 36.7 (02-15-24 @ 14:25)  T(F): 98 (02-15-24 @ 14:25), Max: 98 (02-15-24 @ 14:25)  HR: 90 (02-15-24 @ 14:25) (90 - 90)  BP: 129/94 (02-15-24 @ 14:25) (129/94 - 129/94)  RR: 20 (02-15-24 @ 14:25) (20 - 20)  SpO2: 98% (02-15-24 @ 14:25) (98% - 98%)    INTAKE AND OUTPUT:       PHYSICAL EXAM:  Constitutional: Comfortable . No acute distress.   HEENT: Atraumatic and normocephalic , neck is supple . no JVD. No carotid bruit.  CNS: A&Ox3. No focal deficits.   Respiratory: CTAB, unlabored   Cardiovascular: RRR normal s1 s2. No murmur. No rubs or gallop.  Gastrointestinal: Soft, non-tender. +Bowel sounds.   Extremities: 2+ Peripheral Pulses, No clubbing, cyanosis, or edema  Psychiatric: Calm . no agitation.   Skin: Warm and dry, no ulcers on extremities     LABS:                          INTERPRETATION OF TELEMETRY: NSR     ECG:  NSR   Prior ECG: Yes [ x ] No [  ]    RADIOLOGY & ADDITIONAL STUDIES:    X-ray:    CT scan:   MRI:   US:

## 2024-02-15 NOTE — ED ADULT NURSE NOTE - OBJECTIVE STATEMENT
Pt presents to ER s/p syncopal episode. pt reports having an argument at dinner and then feeling chest discomfort; pt took one sublingual nitro and then felt dizzy and passed out for a few seconds in a chair. Event was witnessed by family. Denies fall Pt presents to the ED A&Ox4 c/o intermittent chest pain, dizziness and SOB fo the past week. Every time she moves there is a wave of symptoms. Pt reports PMH of POTS taking metoprolol. Pt RR even and unlabored, NAD noted. Pt denies abdominal pain.

## 2024-02-15 NOTE — CONSULT NOTE ADULT - NS ATTEND AMEND GEN_ALL_CORE FT
-    	  23F hx of POTS and PNES presented to the ED with worsening lightheadedness and chest aches  Cardiology consulted for lightheadedness and chest pain.     Chest pain.   - serial CE, EKG, telemetry  - [facility] Cardiac CTA normal, no acute findings.    Lightheadedness.   - hx of POTS, PNES  - orthostatics negative     Postural orthostatic tachycardia syndrome [POTS].    - hx of POTS   - was tolerating metoprolol and fludrocortisone outpatient with no POTS episodes in about a year   - will f/u outpatient EP and cardiology.       Isabell Joe (DOUGIE)  (Signed 15-Feb-2024 17:24)  	Authored: Consult Note, Referral/Consultation, Subjective and Objective, Assessment and Recommendation      Last Updated: 15-Feb-2024 17:24 by Isabell Joe (NP) -    	  23F hx of POTS and PNES presented to the ED with worsening lightheadedness and chest aches  Cardiology consulted for lightheadedness and chest pain.     Chest pain.   - serial CE, EKG, telemetry  - [facility] Cardiac CTA normal, no acute findings.    Lightheadedness.   - hx of POTS, PNES  - orthostatics negative     Postural orthostatic tachycardia syndrome [POTS].    - hx of POTS   - was tolerating metoprolol and fludrocortisone outpatient with no POTS episodes in about a year   - will f/u outpatient EP and cardiology.

## 2024-02-15 NOTE — ED PROVIDER NOTE - PATIENT PORTAL LINK FT
You can access the FollowMyHealth Patient Portal offered by [facility] by registering at the following website: [email or web address]. By joining [employer]’s FollowMyHealth portal, you will also be able to view your health information using other applications (apps) compatible with our system.

## 2024-02-15 NOTE — CONSULT NOTE ADULT - PROBLEM SELECTOR RECOMMENDATION 9
- pt reports chest pain/chest achiness for the past week  - serial ekg, serial troponin   - EKG NSR non ischemic.   - monitor on telemetry.   - Last echo from 11/17/2020  EF 60%. Normal LV function   - Exercise stress test showed SR with appropriate HR and BP response   - Cardiac CTA normal, no acute findings.

## 2024-02-15 NOTE — ED ADULT TRIAGE NOTE - CHIEF COMPLAINT QUOTE
Pt arrives to ED c/o chest pain and feeling dizzy for over a week- currently under cardiologist care

## 2024-02-15 NOTE — ED PROVIDER NOTE - NSICDXPASTMEDICALHX_GEN_ALL_CORE_FT
PAST MEDICAL HISTORY:  Frequent UTI     MVP (mitral valve prolapse)     Postural orthostatic tachycardia syndrome [POTS]

## 2024-02-15 NOTE — ED ADULT NURSE NOTE - NSFALLRISKINTERV_ED_ALL_ED

## 2024-02-15 NOTE — ED PROVIDER NOTE - PHYSICAL EXAMINATION
Gen: Pt lying in bed, slightly uncomfortable due to dizziness, NAD  Cardio: normal RR, no MGR, normal s1,s2  Pulm: CTA b/l a/p, no wrr  Abd: NT, ND, normoactive BS  Neuro: CN 2-12 intact, no focal deficits  MSK: +5 stength b/l extremities

## 2024-02-15 NOTE — ED ADULT NURSE NOTE - COVID-19 ORDERING FACILITY
[facility]  [facility]

## 2024-02-15 NOTE — CONSULT NOTE ADULT - ASSESSMENT
24 y/o F with hx of POTS and PNES presented to the ED with worsening lightheadedness and chest pain (which feels more like chest achiness). Patient has hx of kidney stones, was on salt tabs but stopped taking it due to worsening kidney stones. 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 Cardiology consulted for lightheadedness and chest pain.

## 2024-02-21 PROBLEM — G90.A POSTURAL ORTHOSTATIC TACHYCARDIA SYNDROME [POTS]: Chronic | Status: ACTIVE | Noted: 2024-02-15

## 2024-02-29 ENCOUNTER — NON-APPOINTMENT (OUTPATIENT)
Age: 24
End: 2024-02-29

## 2024-03-19 ENCOUNTER — RX RENEWAL (OUTPATIENT)
Age: 24
End: 2024-03-19

## 2024-03-19 RX ORDER — METOPROLOL SUCCINATE 100 MG/1
100 TABLET, EXTENDED RELEASE ORAL
Qty: 90 | Refills: 3 | Status: ACTIVE | COMMUNITY
Start: 2021-03-23 | End: 1900-01-01

## 2024-03-25 ENCOUNTER — APPOINTMENT (OUTPATIENT)
Dept: CARDIOLOGY | Facility: CLINIC | Age: 24
End: 2024-03-25
Payer: MEDICAID

## 2024-03-25 VITALS
WEIGHT: 129 LBS | HEIGHT: 65 IN | DIASTOLIC BLOOD PRESSURE: 74 MMHG | OXYGEN SATURATION: 99 % | SYSTOLIC BLOOD PRESSURE: 100 MMHG | BODY MASS INDEX: 21.49 KG/M2 | HEART RATE: 71 BPM

## 2024-03-25 VITALS — DIASTOLIC BLOOD PRESSURE: 68 MMHG | SYSTOLIC BLOOD PRESSURE: 88 MMHG

## 2024-03-25 DIAGNOSIS — R42 DIZZINESS AND GIDDINESS: ICD-10-CM

## 2024-03-25 DIAGNOSIS — I47.11 INAPPROPRIATE SINUS TACHYCARDIA, SO STATED: ICD-10-CM

## 2024-03-25 PROCEDURE — 93000 ELECTROCARDIOGRAM COMPLETE: CPT

## 2024-03-25 PROCEDURE — 99214 OFFICE O/P EST MOD 30 MIN: CPT | Mod: 25

## 2024-03-25 PROCEDURE — 99204 OFFICE O/P NEW MOD 45 MIN: CPT | Mod: 25

## 2024-04-10 ENCOUNTER — APPOINTMENT (OUTPATIENT)
Dept: CARDIOLOGY | Facility: CLINIC | Age: 24
End: 2024-04-10
Payer: MEDICAID

## 2024-04-10 PROCEDURE — 93306 TTE W/DOPPLER COMPLETE: CPT

## 2024-04-10 NOTE — CARDIOLOGY SUMMARY
[de-identified] : Normal sinus rhythm, nonspecific ST-T wave changes lead III [de-identified] : - POTS -EVENT MONITOR: 2/27/2024, NSR 81 (), 10 symptoms corresponded to sinus rhythm - Echocardiogram:  4/10/2024, EF 55%, mitral valve prolapse, trace tricuspid regurgitation PA systolic 18

## 2024-04-10 NOTE — HISTORY OF PRESENT ILLNESS
[FreeTextEntry1] : The patient is a 23-year-old white female with a past medical history remarkable for POTS who presents for evaluation of chest pain and dizziness. The patient reports that she developed chest pain approximately 1 week ago.  It is described as a "ache" that occurs at any time and is not ameliorated or exacerbated by any intervention.  The patient reports that several weeks ago she experienced an exacerbation of her dizziness.  She reports that it has returned to baseline. Laboratories from last month demonstrated a normal CBC and normal electrolytes.  Event monitoring from last month demonstrated sinus rhythm during 10 symptoms of dizziness. The patient tries to exercise 2-3 times per week.  Sometimes she is limited by her dizziness.

## 2024-04-10 NOTE — DISCUSSION/SUMMARY
[EKG obtained to assist in diagnosis and management of assessed problem(s)] : EKG obtained to assist in diagnosis and management of assessed problem(s) [FreeTextEntry1] : Dizziness An echocardiogram was ordered to rule out a cardiomyopathy or valvular abnormality as the etiology of her chest pain and dizziness. Continue increased water intake, increased sodium intake, beta-blocker and steroid therapy. If the patient were to experience an exacerbation of her symptoms, we will consider increasing her medical regimen TFTs ordered  Chest pain Evaluation as noted above  RTO after testing

## 2024-04-14 LAB
T3FREE SERPL-MCNC: 2.88 PG/ML
T4 FREE SERPL-MCNC: 1.4 NG/DL
TSH SERPL-ACNC: 1.18 UIU/ML

## 2024-04-17 ENCOUNTER — APPOINTMENT (OUTPATIENT)
Dept: CARDIOLOGY | Facility: CLINIC | Age: 24
End: 2024-04-17
Payer: MEDICAID

## 2024-04-17 VITALS
BODY MASS INDEX: 21.49 KG/M2 | DIASTOLIC BLOOD PRESSURE: 70 MMHG | SYSTOLIC BLOOD PRESSURE: 110 MMHG | HEIGHT: 65 IN | HEART RATE: 74 BPM | WEIGHT: 129 LBS | OXYGEN SATURATION: 99 %

## 2024-04-17 DIAGNOSIS — R07.9 CHEST PAIN, UNSPECIFIED: ICD-10-CM

## 2024-04-17 DIAGNOSIS — G90.A POSTURAL ORTHOSTATIC TACHYCARDIA SYNDROME [POTS]: ICD-10-CM

## 2024-04-17 DIAGNOSIS — R42 DIZZINESS AND GIDDINESS: ICD-10-CM

## 2024-04-17 PROCEDURE — 99214 OFFICE O/P EST MOD 30 MIN: CPT

## 2024-04-17 NOTE — DISCUSSION/SUMMARY
[FreeTextEntry1] : Dizziness Continue increased water intake, increased sodium intake, beta-blocker and steroid therapy. If the patient were to experience an exacerbation of her symptoms, we will consider increasing her medical regimen  Chest pain Resolved. Normal LV function by echocardiography  No further cardiac evaluation at this time   RTO 1 year; sooner, if there is an exacerbation of her symptoms

## 2024-04-17 NOTE — CARDIOLOGY SUMMARY
[de-identified] : - POTS -EVENT MONITOR: 2/27/2024, NSR 81 (), 10 symptoms corresponded to sinus rhythm - Echocardiogram:  4/10/2024, EF 55%, mitral valve prolapse, trace tricuspid regurgitation PA systolic 18

## 2024-04-17 NOTE — HISTORY OF PRESENT ILLNESS
[FreeTextEntry1] : The patient is a 23-year-old white female with a past medical history remarkable for POTS who presented for evaluation of chest pain and dizziness. The patient underwent an echocardiogram Laboratories from last month demonstrated a normal CBC and normal electrolytes.  Event monitoring from last month demonstrated sinus rhythm during 10 symptoms of dizziness.  TFTs were ordered and are normal. The patient reports that her chest discomfort has resolved.  She has been increasing her exercise and her dizziness has improved. The patient tries to exercise 2-3 times per week.  Sometimes she is limited by her dizziness.

## 2024-06-24 ENCOUNTER — RX RENEWAL (OUTPATIENT)
Age: 24
End: 2024-06-24

## 2024-06-24 RX ORDER — FLUDROCORTISONE ACETATE 0.1 MG/1
0.1 TABLET ORAL
Qty: 180 | Refills: 0 | Status: ACTIVE | COMMUNITY
Start: 2021-07-20 | End: 1900-01-01

## 2024-08-05 NOTE — PATIENT PROFILE ADULT - INFLUENZA IMMUNIZATION DATE (APPROXIMATE)
Pt came in today for a one week BP check. Initial BP was 152/98. After ten minutes BP was 148/82. Pt states she has been monitoring her BP at home:   08/04 144/80  08/03 145/87 & 152/92  08/02 132/93 & 146/89    Pt states she does not feel that the BP medication is working & has been trying essential oils instead of the xanax, but has taken then at night for sleep. States her stress level has come down but she has been having more headaches. Pt is wondering if trauma to her inner thigh would cause raised BP. She fell and hit her inner right leg a few weeks ago. Pls advise.    16-Sep-2020

## 2024-08-28 ENCOUNTER — NON-APPOINTMENT (OUTPATIENT)
Age: 24
End: 2024-08-28

## 2024-10-11 ENCOUNTER — APPOINTMENT (OUTPATIENT)
Dept: OBGYN | Facility: CLINIC | Age: 24
End: 2024-10-11

## 2024-10-11 VITALS
DIASTOLIC BLOOD PRESSURE: 80 MMHG | BODY MASS INDEX: 23.32 KG/M2 | WEIGHT: 140 LBS | SYSTOLIC BLOOD PRESSURE: 122 MMHG | HEIGHT: 65 IN

## 2024-10-11 DIAGNOSIS — Z30.41 ENCOUNTER FOR SURVEILLANCE OF CONTRACEPTIVE PILLS: ICD-10-CM

## 2024-10-11 DIAGNOSIS — G90.A POSTURAL ORTHOSTATIC TACHYCARDIA SYNDROME [POTS]: ICD-10-CM

## 2024-10-11 DIAGNOSIS — Z01.411 ENCOUNTER FOR GYNECOLOGICAL EXAMINATION (GENERAL) (ROUTINE) WITH ABNORMAL FINDINGS: ICD-10-CM

## 2024-10-11 PROCEDURE — 99459 PELVIC EXAMINATION: CPT | Mod: NC

## 2024-10-11 PROCEDURE — 99385 PREV VISIT NEW AGE 18-39: CPT

## 2024-10-11 RX ORDER — NORETHINDRONE ACETATE AND ETHINYL ESTRADIOL 1MG-20(21)
1-20 KIT ORAL
Qty: 84 | Refills: 1 | Status: ACTIVE | COMMUNITY
Start: 2024-10-11 | End: 1900-01-01

## 2024-10-15 LAB
C TRACH RRNA SPEC QL NAA+PROBE: NOT DETECTED
HPV HIGH+LOW RISK DNA PNL CVX: NOT DETECTED
N GONORRHOEA RRNA SPEC QL NAA+PROBE: NOT DETECTED
SOURCE TP AMPLIFICATION: NORMAL

## 2024-10-17 LAB — CYTOLOGY CVX/VAG DOC THIN PREP: NORMAL

## 2025-01-14 ENCOUNTER — APPOINTMENT (OUTPATIENT)
Dept: OBGYN | Facility: CLINIC | Age: 25
End: 2025-01-14
Payer: COMMERCIAL

## 2025-01-14 VITALS
BODY MASS INDEX: 22.49 KG/M2 | WEIGHT: 135 LBS | DIASTOLIC BLOOD PRESSURE: 70 MMHG | HEIGHT: 65 IN | SYSTOLIC BLOOD PRESSURE: 110 MMHG

## 2025-01-14 DIAGNOSIS — R10.2 PELVIC AND PERINEAL PAIN: ICD-10-CM

## 2025-01-14 PROCEDURE — 99459 PELVIC EXAMINATION: CPT | Mod: NC

## 2025-01-14 PROCEDURE — 99213 OFFICE O/P EST LOW 20 MIN: CPT

## 2025-01-15 ENCOUNTER — OUTPATIENT (OUTPATIENT)
Dept: OUTPATIENT SERVICES | Facility: HOSPITAL | Age: 25
LOS: 1 days | End: 2025-01-15
Payer: COMMERCIAL

## 2025-01-15 ENCOUNTER — APPOINTMENT (OUTPATIENT)
Dept: ULTRASOUND IMAGING | Facility: CLINIC | Age: 25
End: 2025-01-15

## 2025-01-15 DIAGNOSIS — R10.2 PELVIC AND PERINEAL PAIN: ICD-10-CM

## 2025-01-15 PROCEDURE — 76856 US EXAM PELVIC COMPLETE: CPT | Mod: 26

## 2025-01-15 PROCEDURE — 76830 TRANSVAGINAL US NON-OB: CPT | Mod: 26

## 2025-01-15 PROCEDURE — 76856 US EXAM PELVIC COMPLETE: CPT

## 2025-01-15 PROCEDURE — 76830 TRANSVAGINAL US NON-OB: CPT

## 2025-01-16 LAB
C TRACH RRNA SPEC QL NAA+PROBE: NOT DETECTED
CANDIDA VAG CYTO: NOT DETECTED
G VAGINALIS+PREV SP MTYP VAG QL MICRO: NOT DETECTED
N GONORRHOEA RRNA SPEC QL NAA+PROBE: NOT DETECTED
SOURCE AMPLIFICATION: NORMAL
T VAGINALIS VAG QL WET PREP: NOT DETECTED

## 2025-02-14 ENCOUNTER — APPOINTMENT (OUTPATIENT)
Dept: UROLOGY | Facility: CLINIC | Age: 25
End: 2025-02-14

## 2025-02-14 ENCOUNTER — OUTPATIENT (OUTPATIENT)
Dept: OUTPATIENT SERVICES | Facility: HOSPITAL | Age: 25
LOS: 1 days | End: 2025-02-14
Payer: COMMERCIAL

## 2025-02-14 ENCOUNTER — APPOINTMENT (OUTPATIENT)
Dept: RADIOLOGY | Facility: CLINIC | Age: 25
End: 2025-02-14
Payer: COMMERCIAL

## 2025-02-14 VITALS
RESPIRATION RATE: 16 BRPM | HEART RATE: 79 BPM | SYSTOLIC BLOOD PRESSURE: 134 MMHG | WEIGHT: 135 LBS | BODY MASS INDEX: 22.49 KG/M2 | HEIGHT: 65 IN | DIASTOLIC BLOOD PRESSURE: 87 MMHG | OXYGEN SATURATION: 97 %

## 2025-02-14 DIAGNOSIS — N39.0 URINARY TRACT INFECTION, SITE NOT SPECIFIED: ICD-10-CM

## 2025-02-14 DIAGNOSIS — E83.50 UNSPECIFIED DISORDER OF CALCIUM METABOLISM: ICD-10-CM

## 2025-02-14 DIAGNOSIS — R31.29 OTHER MICROSCOPIC HEMATURIA: ICD-10-CM

## 2025-02-14 DIAGNOSIS — N20.9 URINARY CALCULUS, UNSPECIFIED: ICD-10-CM

## 2025-02-14 PROCEDURE — 74018 RADEX ABDOMEN 1 VIEW: CPT | Mod: 26

## 2025-02-14 PROCEDURE — 99204 OFFICE O/P NEW MOD 45 MIN: CPT

## 2025-02-14 PROCEDURE — 74018 RADEX ABDOMEN 1 VIEW: CPT

## 2025-02-14 RX ORDER — HYDROCHLOROTHIAZIDE 12.5 MG/1
12.5 TABLET ORAL DAILY
Qty: 30 | Refills: 1 | Status: ACTIVE | COMMUNITY
Start: 2025-02-14 | End: 1900-01-01

## 2025-02-14 RX ORDER — POTASSIUM CITRATE 10 MEQ/1
10 MEQ TABLET, EXTENDED RELEASE ORAL TWICE DAILY
Qty: 180 | Refills: 3 | Status: ACTIVE | COMMUNITY
Start: 2025-02-14 | End: 1900-01-01

## 2025-02-15 LAB
ANION GAP SERPL CALC-SCNC: 11 MMOL/L
BUN SERPL-MCNC: 8 MG/DL
CALCIUM SERPL-MCNC: 9.7 MG/DL
CALCIUM SERPL-MCNC: 9.7 MG/DL
CHLORIDE SERPL-SCNC: 104 MMOL/L
CO2 SERPL-SCNC: 25 MMOL/L
CREAT SERPL-MCNC: 0.75 MG/DL
EGFR: 115 ML/MIN/1.73M2
GLUCOSE SERPL-MCNC: 79 MG/DL
PARATHYROID HORMONE INTACT: 27 PG/ML
POTASSIUM SERPL-SCNC: 3.7 MMOL/L
SODIUM SERPL-SCNC: 141 MMOL/L
URATE SERPL-MCNC: 3.2 MG/DL

## 2025-02-16 LAB
APPEARANCE: ABNORMAL
BACTERIA: ABNORMAL /HPF
BILIRUBIN URINE: ABNORMAL
BLOOD URINE: ABNORMAL
CALCIUM OXALATE CRYSTALS: PRESENT
CAST: 1 /LPF
COLOR: ABNORMAL
EPITHELIAL CELLS: 3 /HPF
GLUCOSE QUALITATIVE U: NEGATIVE MG/DL
HYALINE CASTS: PRESENT
KETONES URINE: NEGATIVE MG/DL
LEUKOCYTE ESTERASE URINE: ABNORMAL
MICROSCOPIC-UA: NORMAL
NITRITE URINE: POSITIVE
PH URINE: 6
PROTEIN URINE: 30 MG/DL
RED BLOOD CELLS URINE: NORMAL /HPF
REVIEW: NORMAL
SPECIFIC GRAVITY URINE: 1.02
UROBILINOGEN URINE: 1 MG/DL
WHITE BLOOD CELLS URINE: 2 /HPF

## 2025-02-17 LAB — BACTERIA UR CULT: ABNORMAL

## 2025-02-18 DIAGNOSIS — N20.9 URINARY CALCULUS, UNSPECIFIED: ICD-10-CM

## 2025-02-18 DIAGNOSIS — R10.9 UNSPECIFIED ABDOMINAL PAIN: ICD-10-CM

## 2025-02-21 LAB — URINE CYTOLOGY: NORMAL

## 2025-02-24 ENCOUNTER — OUTPATIENT (OUTPATIENT)
Dept: OUTPATIENT SERVICES | Facility: HOSPITAL | Age: 25
LOS: 1 days | End: 2025-02-24

## 2025-02-24 ENCOUNTER — NON-APPOINTMENT (OUTPATIENT)
Age: 25
End: 2025-02-24

## 2025-02-24 DIAGNOSIS — Z00.8 ENCOUNTER FOR OTHER GENERAL EXAMINATION: ICD-10-CM

## 2025-02-27 ENCOUNTER — NON-APPOINTMENT (OUTPATIENT)
Age: 25
End: 2025-02-27

## 2025-02-27 DIAGNOSIS — N20.0 CALCULUS OF KIDNEY: ICD-10-CM

## 2025-03-06 ENCOUNTER — APPOINTMENT (OUTPATIENT)
Dept: UROLOGY | Facility: CLINIC | Age: 25
End: 2025-03-06
Payer: COMMERCIAL

## 2025-03-06 VITALS
SYSTOLIC BLOOD PRESSURE: 111 MMHG | DIASTOLIC BLOOD PRESSURE: 79 MMHG | TEMPERATURE: 98 F | BODY MASS INDEX: 21.99 KG/M2 | HEIGHT: 65 IN | HEART RATE: 79 BPM | WEIGHT: 132 LBS | RESPIRATION RATE: 14 BRPM | OXYGEN SATURATION: 97 %

## 2025-03-06 DIAGNOSIS — N39.0 URINARY TRACT INFECTION, SITE NOT SPECIFIED: ICD-10-CM

## 2025-03-06 PROCEDURE — 51798 US URINE CAPACITY MEASURE: CPT

## 2025-03-06 PROCEDURE — 51784 ANAL/URINARY MUSCLE STUDY: CPT

## 2025-03-06 PROCEDURE — 51729 CYSTOMETROGRAM W/VP&UP: CPT

## 2025-03-06 PROCEDURE — 51797 INTRAABDOMINAL PRESSURE TEST: CPT

## 2025-03-06 RX ORDER — SULFAMETHOXAZOLE AND TRIMETHOPRIM 800; 160 MG/1; MG/1
800-160 TABLET ORAL TWICE DAILY
Qty: 8 | Refills: 0 | Status: ACTIVE | COMMUNITY
Start: 2025-03-06 | End: 1900-01-01

## 2025-03-06 RX ORDER — NITROFURANTOIN MACROCRYSTALS 50 MG/1
50 CAPSULE ORAL
Qty: 45 | Refills: 2 | Status: ACTIVE | COMMUNITY
Start: 2025-03-06 | End: 1900-01-01

## 2025-03-07 LAB
APPEARANCE: ABNORMAL
BACTERIA: ABNORMAL /HPF
BILIRUBIN URINE: NEGATIVE
BLOOD URINE: NEGATIVE
CAST: 2 /LPF
COLOR: YELLOW
EPITHELIAL CELLS: 3 /HPF
GLUCOSE QUALITATIVE U: NEGATIVE MG/DL
KETONES URINE: NEGATIVE MG/DL
LEUKOCYTE ESTERASE URINE: ABNORMAL
MICROSCOPIC-UA: NORMAL
NITRITE URINE: POSITIVE
PH URINE: 8
PROTEIN URINE: 30 MG/DL
RED BLOOD CELLS URINE: 1 /HPF
SPECIFIC GRAVITY URINE: 1.02
UROBILINOGEN URINE: 0.2 MG/DL
WHITE BLOOD CELLS URINE: 18 /HPF

## 2025-03-09 LAB — BACTERIA UR CULT: ABNORMAL

## 2025-03-12 ENCOUNTER — APPOINTMENT (OUTPATIENT)
Dept: RADIOLOGY | Facility: CLINIC | Age: 25
End: 2025-03-12

## 2025-03-12 ENCOUNTER — APPOINTMENT (OUTPATIENT)
Dept: CT IMAGING | Facility: CLINIC | Age: 25
End: 2025-03-12

## 2025-03-14 ENCOUNTER — RX RENEWAL (OUTPATIENT)
Age: 25
End: 2025-03-14

## 2025-03-25 ENCOUNTER — RX RENEWAL (OUTPATIENT)
Age: 25
End: 2025-03-25

## 2025-03-26 ENCOUNTER — APPOINTMENT (OUTPATIENT)
Dept: INTERNAL MEDICINE | Facility: CLINIC | Age: 25
End: 2025-03-26

## 2025-04-17 ENCOUNTER — APPOINTMENT (OUTPATIENT)
Dept: CARDIOLOGY | Facility: CLINIC | Age: 25
End: 2025-04-17

## 2025-04-23 ENCOUNTER — APPOINTMENT (OUTPATIENT)
Dept: NEUROLOGY | Facility: CLINIC | Age: 25
End: 2025-04-23
Payer: COMMERCIAL

## 2025-04-23 VITALS
WEIGHT: 132 LBS | HEIGHT: 65 IN | SYSTOLIC BLOOD PRESSURE: 116 MMHG | HEART RATE: 82 BPM | DIASTOLIC BLOOD PRESSURE: 83 MMHG | BODY MASS INDEX: 21.99 KG/M2

## 2025-04-23 DIAGNOSIS — R10.32 RIGHT LOWER QUADRANT PAIN: ICD-10-CM

## 2025-04-23 DIAGNOSIS — G89.29 LOW BACK PAIN, UNSPECIFIED: ICD-10-CM

## 2025-04-23 DIAGNOSIS — N32.9 BLADDER DISORDER, UNSPECIFIED: ICD-10-CM

## 2025-04-23 DIAGNOSIS — M54.50 LOW BACK PAIN, UNSPECIFIED: ICD-10-CM

## 2025-04-23 DIAGNOSIS — R10.31 RIGHT LOWER QUADRANT PAIN: ICD-10-CM

## 2025-04-23 PROCEDURE — 99204 OFFICE O/P NEW MOD 45 MIN: CPT

## 2025-05-17 ENCOUNTER — APPOINTMENT (OUTPATIENT)
Dept: MRI IMAGING | Facility: CLINIC | Age: 25
End: 2025-05-17

## 2025-06-05 ENCOUNTER — RX RENEWAL (OUTPATIENT)
Age: 25
End: 2025-06-05

## 2025-06-24 ENCOUNTER — APPOINTMENT (OUTPATIENT)
Dept: CARDIOLOGY | Facility: CLINIC | Age: 25
End: 2025-06-24
Payer: COMMERCIAL

## 2025-06-24 VITALS
BODY MASS INDEX: 21.99 KG/M2 | SYSTOLIC BLOOD PRESSURE: 96 MMHG | WEIGHT: 132 LBS | HEIGHT: 65 IN | DIASTOLIC BLOOD PRESSURE: 62 MMHG | HEART RATE: 84 BPM | OXYGEN SATURATION: 100 %

## 2025-06-24 PROCEDURE — 99214 OFFICE O/P EST MOD 30 MIN: CPT

## 2025-09-17 ENCOUNTER — APPOINTMENT (OUTPATIENT)
Dept: CARDIOLOGY | Facility: CLINIC | Age: 25
End: 2025-09-17
Payer: COMMERCIAL

## 2025-09-17 VITALS
DIASTOLIC BLOOD PRESSURE: 66 MMHG | BODY MASS INDEX: 21.66 KG/M2 | HEART RATE: 80 BPM | WEIGHT: 130 LBS | SYSTOLIC BLOOD PRESSURE: 108 MMHG | OXYGEN SATURATION: 98 % | HEIGHT: 65 IN

## 2025-09-17 DIAGNOSIS — Z86.79 PERSONAL HISTORY OF OTHER DISEASES OF THE CIRCULATORY SYSTEM: ICD-10-CM

## 2025-09-17 DIAGNOSIS — R42 DIZZINESS AND GIDDINESS: ICD-10-CM

## 2025-09-17 DIAGNOSIS — I34.1 NONRHEUMATIC MITRAL (VALVE) PROLAPSE: ICD-10-CM

## 2025-09-17 DIAGNOSIS — G90.A POSTURAL ORTHOSTATIC TACHYCARDIA SYNDROME [POTS]: ICD-10-CM

## 2025-09-17 PROCEDURE — 99214 OFFICE O/P EST MOD 30 MIN: CPT
